# Patient Record
Sex: MALE | Race: BLACK OR AFRICAN AMERICAN | Employment: OTHER | URBAN - METROPOLITAN AREA
[De-identification: names, ages, dates, MRNs, and addresses within clinical notes are randomized per-mention and may not be internally consistent; named-entity substitution may affect disease eponyms.]

---

## 2017-01-10 ENCOUNTER — DOCUMENTATION ONLY (OUTPATIENT)
Dept: ORTHOPEDIC SURGERY | Age: 62
End: 2017-01-10

## 2017-01-10 NOTE — PROGRESS NOTES
[facility]. Faxed to Morris KANG at [facility].

## 2017-01-11 ENCOUNTER — DOCUMENTATION ONLY (OUTPATIENT)
Dept: PAIN MANAGEMENT | Age: 62
End: 2017-01-11

## 2017-01-11 NOTE — PROGRESS NOTES
Request for records from The [address] [place] [address] and faxed request to [employer] to be process on 01/11/2017.

## 2017-01-16 ENCOUNTER — DOCUMENTATION ONLY (OUTPATIENT)
Dept: ORTHOPEDIC SURGERY | Age: 62
End: 2017-01-16

## 2017-01-17 ENCOUNTER — DOCUMENTATION ONLY (OUTPATIENT)
Dept: ORTHOPEDIC SURGERY | Facility: CLINIC | Age: 62
End: 2017-01-17

## 2017-01-17 NOTE — PROGRESS NOTES
Dept of [facility] request for records was sent to [address] [place] [address] for processing.

## 2017-01-25 ENCOUNTER — OFFICE VISIT (OUTPATIENT)
Dept: ORTHOPEDIC SURGERY | Age: 62
End: 2017-01-25

## 2017-01-25 VITALS
BODY MASS INDEX: 30.34 KG/M2 | TEMPERATURE: 98 F | SYSTOLIC BLOOD PRESSURE: 123 MMHG | DIASTOLIC BLOOD PRESSURE: 69 MMHG | WEIGHT: 230 LBS | HEART RATE: 70 BPM

## 2017-01-25 DIAGNOSIS — M17.0 PRIMARY OSTEOARTHRITIS OF BOTH KNEES: Primary | ICD-10-CM

## 2017-01-25 RX ORDER — HYALURONATE SODIUM 10 MG/ML
2 SYRINGE (ML) INTRAARTICULAR ONCE
Qty: 4 ML | Refills: 0
Start: 2017-01-25 | End: 2017-01-25

## 2017-01-25 NOTE — PROGRESS NOTES
Patient: Coreen Rose                MRN: [id number]       SSN: [id number]  YOB: 1955        AGE: 59 y.o. SEX: male  Body mass index is 30.34 kg/(m^2). PCP: Reno Mortimer, MD  01/25/17    Chief Complaint   Patient presents with    Knee Pain     Manolo       HISTORY:  Coreen Rose is a 59 y.o. male who is seen for bilateral knee pain and euflexxa injections. PROCEDURE:  Patient's bilateral knees, after timeout under sterile conditions, were injected with 2 cc of Euflexxa. [facility] - [facility]  OFFICE PROCEDURE PROGRESS NOTE        Chart reviewed for the following:   Rosalina Villagran MD, have reviewed the History, Physical and updated the Allergic reactions for [address] performed immediately prior to start of procedure:   Rosalina Villagran MD, have performed the following reviews on Coreen Rose prior to the start of the procedure:            * Patient was identified by name and date of birth   * Agreement on procedure being performed was verified  * Risks and Benefits explained to the patient  * Procedure site verified and marked as necessary  * Patient was positioned for comfort  * Consent was signed and verified     Time: 4:16 PM       Date of procedure: 1/25/2017    Procedure performed by:  Ulises Boykin MD    Provider assisted by: None     How tolerated by patient: tolerated the procedure well with no complications    Comments: none    IMPRESSION:     ICD-10-CM ICD-9-CM    1. Primary osteoarthritis of both knees M17.0 715.16 [place] DRAIN/INJECT LARGE JOINT/BURSA      EUFLEXXA INJECTION PER DOSE      sodium hyaluronate (SUPARTZ FX/HYALGAN/GENIVSC) 10 mg/mL syrg injection        PLAN:  Mr. Florian Tian will return in one week for his second Euflexxa injection.       Scribed by Minnie Lee ([facility]) as dictated by MD Ulises Díaz M.D.   [facility] and [facility]

## 2017-01-25 NOTE — PATIENT INSTRUCTIONS
Hyaluronic Acid (By injection)   Hyaluronic Acid (cwd-br-eji-ON-ate AS-id)  Treats severe pain in your knee due to osteoarthritis. Brand Name(s):Euflexxa, Gel-One, GenVisc 850, Hyalgan, Hymovis, Monovisc, Orthovisc, Supartz, Supartz FX   There may be other brand names for this medicine. When This Medicine Should Not Be Used: This medicine is not right for everyone. You should not receive it if you had an allergic reaction to hyaluronic acid or if you have a bleeding disorder. How to Use This Medicine:   Injectable  · Your doctor will tell you how many injections you will need. This medicine is injected into your knee joint. · A nurse or other health provider will give you this medicine. Drugs and Foods to Avoid:      Ask your doctor or pharmacist before using any other medicine, including over-the-counter medicines, vitamins, and herbal products. Warnings While Using This Medicine:   · Tell your doctor if you are pregnant or breastfeeding, or if you have any allergies, including to birds, feathers, or eggs. · Rest your knee for 48 hours after you receive an injection. Do not do strenuous, weightbearing activities, such as jogging or tennis. Avoid activities that keep you standing for longer than 1 hour. Possible Side Effects While Using This Medicine:   Call your doctor right away if you notice any of these side effects:  · Allergic reaction: Itching or hives, swelling in your face or hands, swelling or tingling in your mouth or throat, chest tightness, trouble breathing  If you notice these less serious side effects, talk with your doctor:   · Mild increase in pain or swelling in your knee  · Pain, redness, or swelling where the medicine is injected  If you notice other side effects that you think are caused by this medicine, tell your doctor. Call your doctor for medical advice about side effects.  You may report side effects to FDA at 2-964-FDA-2827  © 2016 [address] is for End User's use only and may not be sold, redistributed or otherwise used for commercial purposes. The above information is an  only. It is not intended as medical advice for individual conditions or treatments. Talk to your doctor, nurse or pharmacist before following any medical regimen to see if it is safe and effective for you.

## 2017-02-01 ENCOUNTER — OFFICE VISIT (OUTPATIENT)
Dept: ORTHOPEDIC SURGERY | Age: 62
End: 2017-02-01

## 2017-02-01 VITALS
HEIGHT: 73 IN | TEMPERATURE: 95.1 F | WEIGHT: 227.6 LBS | HEART RATE: 61 BPM | BODY MASS INDEX: 30.17 KG/M2 | SYSTOLIC BLOOD PRESSURE: 129 MMHG | DIASTOLIC BLOOD PRESSURE: 86 MMHG

## 2017-02-01 DIAGNOSIS — M17.0 BILATERAL PRIMARY OSTEOARTHRITIS OF KNEE: Primary | ICD-10-CM

## 2017-02-01 RX ORDER — HYALURONATE SODIUM 10 MG/ML
2 SYRINGE (ML) INTRAARTICULAR ONCE
Qty: 2 ML | Refills: 0
Start: 2017-02-01 | End: 2017-02-01

## 2017-02-01 NOTE — PATIENT INSTRUCTIONS

## 2017-02-01 NOTE — PROGRESS NOTES
Patient: Aron Taylor                MRN: [id number]       SSN: [id number]  YOB: 1955        AGE: 59 y.o. SEX: male  Body mass index is 30.03 kg/(m^2). PCP: Willy De La Garza MD  02/01/17    Chief Complaint   Patient presents with    Knee Pain     Manolo       HISTORY:  Aron Taylor is a 59 y.o. male who is seen for bilateral knee pain and the second euflexxa injections. PROCEDURE:  Patient's bilateral knees, after timeout under sterile conditions, were injected with 2 cc of Euflexxa. [facility]  OFFICE PROCEDURE PROGRESS NOTE        Chart reviewed for the following:   Yvonne Estrada MD, have reviewed the History, Physical and updated the Allergic reactions for [address] performed immediately prior to start of procedure:   Yvonne Estrada MD, have performed the following reviews on Aron Taylor prior to the start of the procedure:            * Patient was identified by name and date of birth   * Agreement on procedure being performed was verified  * Risks and Benefits explained to the patient  * Procedure site verified and marked as necessary  * Patient was positioned for comfort  * Consent was signed and verified     Time: 8:11 AM    Date of procedure: 2/1/2017    Procedure performed by:  Wilfredo Dean MD    Provider assisted by: None     How tolerated by patient: tolerated the procedure well with no complications    Comments: none    IMPRESSION:     ICD-10-CM ICD-9-CM    1. Bilateral primary osteoarthritis of knee M17.0 715.16 [place] DRAIN/INJECT LARGE JOINT/BURSA      EUFLEXXA INJECTION PER DOSE      sodium hyaluronate (SUPARTZ FX/HYALGAN/GENIVSC) 10 mg/mL syrg injection        PLAN:  Mr. Mekhi Gamez will return in one week for his third Euflexxa injection.       Scribed by You Josue ([address]) as dictated by MD Wilfredo Ureña M.D.   [facility] and [facility] Specialist

## 2017-02-08 ENCOUNTER — OFFICE VISIT (OUTPATIENT)
Dept: ORTHOPEDIC SURGERY | Age: 62
End: 2017-02-08

## 2017-02-08 VITALS
DIASTOLIC BLOOD PRESSURE: 65 MMHG | WEIGHT: 231.7 LBS | TEMPERATURE: 95.8 F | SYSTOLIC BLOOD PRESSURE: 122 MMHG | HEIGHT: 73 IN | BODY MASS INDEX: 30.71 KG/M2 | HEART RATE: 66 BPM

## 2017-02-08 DIAGNOSIS — M17.0 BILATERAL PRIMARY OSTEOARTHRITIS OF KNEE: Primary | ICD-10-CM

## 2017-02-08 RX ORDER — HYALURONATE SODIUM 10 MG/ML
2 SYRINGE (ML) INTRAARTICULAR ONCE
Qty: 2 ML | Refills: 0
Start: 2017-02-08 | End: 2017-02-08

## 2017-03-01 ENCOUNTER — OFFICE VISIT (OUTPATIENT)
Dept: ORTHOPEDIC SURGERY | Age: 62
End: 2017-03-01

## 2017-03-01 VITALS
DIASTOLIC BLOOD PRESSURE: 75 MMHG | HEART RATE: 68 BPM | RESPIRATION RATE: 14 BRPM | OXYGEN SATURATION: 98 % | SYSTOLIC BLOOD PRESSURE: 115 MMHG | TEMPERATURE: 97.5 F | WEIGHT: 225 LBS | BODY MASS INDEX: 29.82 KG/M2 | HEIGHT: 73 IN

## 2017-03-01 DIAGNOSIS — M17.0 BILATERAL PRIMARY OSTEOARTHRITIS OF KNEE: Primary | ICD-10-CM

## 2017-03-01 NOTE — PROGRESS NOTES
Judy Calderon  1955   Chief Complaint   Patient presents with    Knee Pain     shant         HISTORY OF PRESENT ILLNESS  Judy Calderon is a 57 y.o. male who presents today for reevaluation of bilateral knees s/p euflexxa injection series. He started feeling some relief after the second injection but following third injection the pain started to worsen again. Today states pain is a 7/10. Very stiff. Has dull aching pain. Feels like he is limping slightly. However, he states from before we started injections he does feel slightly better. Patient denies any fever, chills, chest pain, shortness of breath or calf pain. There are no new illness or injuries to report since last seen in the office. No changes in medications, allergies, social or family history. PHYSICAL EXAM:   Visit Vitals    /75 (BP 1 Location: Left arm, BP Patient Position: Sitting)    Pulse 68    Temp 97.5 °F (36.4 °C) (Oral)    Resp 14    Ht 6' 1\" (1.854 m)    Wt 225 lb (102.1 kg)    SpO2 98%    BMI 29.69 kg/m2     The patient is a well-developed, well-nourished male   in no acute distress. The patient is alert and oriented times three. The patient is alert and oriented times three. Mood and affect are normal.  LYMPHATIC: lymph nodes are not enlarged and are within normal limits  SKIN: normal in color and non tender to palpation. There are no bruises or abrasions noted. NEUROLOGICAL: Motor sensory exam is within normal limits. Reflexes are equal bilaterally.  There is normal sensation to pinprick and light touch  MUSCULOSKELETAL:  Examination Left knee Right knee   Skin Intact Intact   Range of motion 0-120 0-120   Effusion - -   Medial joint line tenderness + +   Lateral joint line tenderness - -   Tenderness Pes Bursa - -   Tenderness insertion MCL - -   Tenderness insertion LCL - -   Renettas - -   Patella crepitus - -   Patella grind - -   Lachman - -   Pivot shift - -   Anterior drawer - -   Posterior drawer - - Varus stress - -   Valgus stress - -   Neurovascular Intact Intact   Calf Swelling and Tenderness to Palpation - -   Shay's Test - -   Hamstring Cord Tightness - -       IMPRESSION:      ICD-10-CM ICD-9-CM    1. Bilateral primary osteoarthritis of knee M17.0 715.16         PLAN:   1. Discussed at length patient's discomfort in bilateral knees. 2. Will continue to see if knees will improve over the next few months  3.  IF his pain worsens will consider total knee replacement and will refer to either Dr. Kathrine Gaffney or Dr. Zahraa Evans  RTC PRN  Follow-up Disposition: Not on [address] [facility]

## 2017-03-14 RX ORDER — METAXALONE 800 MG/1
TABLET ORAL
Qty: 30 TAB | Refills: 0 | Status: SHIPPED | OUTPATIENT
Start: 2017-03-14

## 2017-08-03 ENCOUNTER — OFFICE VISIT (OUTPATIENT)
Dept: PAIN MANAGEMENT | Age: 62
End: 2017-08-03

## 2017-08-03 VITALS
HEART RATE: 63 BPM | RESPIRATION RATE: 14 BRPM | SYSTOLIC BLOOD PRESSURE: 128 MMHG | WEIGHT: 236 LBS | DIASTOLIC BLOOD PRESSURE: 70 MMHG | BODY MASS INDEX: 31.28 KG/M2 | HEIGHT: 73 IN | TEMPERATURE: 96.8 F

## 2017-08-03 DIAGNOSIS — G89.4 CHRONIC PAIN SYNDROME: ICD-10-CM

## 2017-08-03 DIAGNOSIS — M43.16 SPONDYLOLISTHESIS, LUMBAR REGION: ICD-10-CM

## 2017-08-03 DIAGNOSIS — M47.816 LUMBAR FACET ARTHROPATHY: ICD-10-CM

## 2017-08-03 DIAGNOSIS — M47.816 SPONDYLOSIS OF LUMBAR REGION WITHOUT MYELOPATHY OR RADICULOPATHY: Primary | ICD-10-CM

## 2017-08-03 NOTE — PROGRESS NOTES
[facility]  Interventional Pain Management Consultation History & Physical    PATIENT NAME:  Gilbert Cuevas     YOB: 1955    DATE OF SERVICE:   8/3/2017      CHIEF COMPLAINT:  Back Pain      REASON FOR VISIT:   Gilbert Cuevas presents to the pain clinic today for follow on evaluation and to consider interventional pain management options as indicated for the type and location of the pain the patient is presenting with. HISTORY OF PRESENT ILLNESS: Patient presents for follow-up evaluation and to reconsider repeating his lumbar radiofrequency neurotomy procedures that we have performed several months ago. He has obtained very good relief from these previous lumbar radiofrequency neurotomy procedures. These were done at bilateral L3-4, L4-5, L5-S1 levels. He states that when these were done several months ago he was able to stand for longer periods of time without pain he was able to walk for longer periods of time without pain overall he felt very much improved with regard to his low back pain. Has begun to develop a recurrence of his low back pain. He denies radicular pain. He denies radiating leg pain. He endorses lumbar facet loading which exacerbates his low back pain, including lumbar twisting and turning, extension and flexion that exacerbate his low back pain. Medications are helping but are becoming less effective      We reviewed his lumbar MRI, this was completed by 19 2016. This is insignificant for lumbar facet arthropathy essentially throughout the lumbar spine from L1 to, L2-3, L3-4, L4-5, L5-S1. No evidence for neural impingement or compression is noted. Overall impression advanced facet arthropathy grade 1 degenerative anterolisthesis and mild left foraminal narrowing. ASSESSMENT/OPTIONS: as follows. We discussed options.   I believe it is very reasonable to repeat this gentlemen's lumbar radiofrequency neurotomy procedures at bilateral L3-4, L4-5, L5-S1 levels. He has obtained very good results from these procedures which were done several months ago. He has had interval of no low back pain with greatly increased activity again not producing any low back pain. His pain has recently begun to recur. He has same pain for which we treated him with lumbar radiofrequency neurotomy procedure so well before. We will repeat these again. I have discussed the risks and benefits, indications, contraindications, and side effects of intended procedure with the patient. I have used skeleton spine model to describe and discuss the procedure with the patient. I have answered all questions relating to the procedure. Patient understands the nature of the procedure and wishes to proceed. Patient has no further questions. MRI Results (most recent):    Results from Abstract encounter on 10/07/15   MRI SHOULDER LT WO CONT        PAST MEDICAL HISTORY:   The patient  has a past medical history of BPH (benign prostatic hyperplasia); Diabetes ([address] Erectile dysfunction; Sleep apnea; and Slow urinary stream.    PAST SURGICAL HISTORY:   The patient  has a past surgical history that includes hernia repair; anesth,surgery of shoulder; orthopaedic (2003 & 2004); knee arthroscopy (Bilateral); and other surgical.    CURRENT MEDICATIONS:   The patient has a current medication list which includes the following prescription(s): amoxicillin, voltaren, ascorbic acid (vitamin c), omega 3-dha-epa-fish oil, ginkgo biloba/panax ginseng rt, sildenafil, sildenafil, metaxalone, dutasteride, tadalafil, freestyle lite strips, piroxicam, metaxalone, azelastine, lidocaine, simvastatin, cetirizine, multivitamin, aspirin delayed-release, cyanocobalamin, and calcium polycarbophil.     ALLERGIES:     Allergies   Allergen Reactions    Other Medication Unable to Obtain     Dust, mold and dander       FAMILY HISTORY:   The patient family history is not on file. He was adopted. SOCIAL HISTORY:   The patient  reports that he quit smoking about 31 years ago. He has never used smokeless tobacco. The patient  reports that he does not drink alcohol. He also  reports that he does not use illicit drugs. REVIEW OF SYSTEMS:    The patient denies fever, chills, weight loss (Constitutional), rash, itching (Skin), tinnitus, congestion (HENT), blurred vision, photophobia (Eyes), palpitations, orthopnea (Cardiovascular), hemoptysis, wheezing (Respiratory), nausea, vomiting, diarrhea (Gastrointestinal), dysuria, hematuria, urgency (Genitourinary), bowel or bladder incontinence, loss of consciousness (Neurologic), suicidal or homicidal ideation or hallucinations (Psychiatric). Denies swelling, axillary or groin masses (Lymphatic). PHYSICAL EXAM:  VS:   Visit Vitals    /70    Pulse 63    Temp 96.8 °F (36 °C)    Resp 14    Ht 6' 1\" (1.854 m)    Wt 107 kg (236 lb)    BMI 31.14 kg/m2     General: Well-developed and well-nourished. Body habitus consistent with recorded height and weight and the calculated BMI. Apparent distress due to low back pain. Head: Normocephalic, atraumatic. Skin: Inspection of the skin reveals no rashes, lesions or infection. CV: Regular rate. No murmurs or rubs noted. No peripheral edema noted. Pulm: Respirations are even and unlabored. Extr: No clubbing, cyanosis, or edema noted. Musculoskeletal:  1. Cervical spine  Full ROM. No paraspinous tenderness at any level. There is no scoliosis, asymmetry, or musculoskeletal defect. 2. Thoracic spine  Full ROM. No paraspinous tenderness at any level. There is no scoliosis, asymmetry, or musculoskeletal defect. 3. Lumbar spine decreased range of motion all axes . Paraspinous tenderness bilaterally. SI joints are nontender bilaterally. There is no scoliosis, asymmetry, or musculoskeletal defect. 4. Right upper extremity  Full ROM. 5/5 muscle strength in all muscle groups. No pain or tenderness in shoulder, elbow, wrist, or hand. 5. Left upper extremity  Full ROM. 5/5 muscle strength in all muscle groups. No pain or tenderness in shoulder, elbow, wrist, or hand. 6. Right lower extremity  Full ROM. 5/5 muscle strength in all muscle groups. No pain, tenderness, or swelling in the hip, knee, ankle or foot. 7. Left lower extremity  Full ROM. 5/5 muscle strength in all muscle groups. No pain, tenderness, or swelling in the hip, knee, ankle or foot. Neurological:  1. Mental Status - Alert, awake and oriented. Speech is clear and appropriate. 2. Cranial Nerves - Extraocular muscles intact bilaterally. Cranial nerves II-XII grossly intact bilaterally. 3. Gait - antalgic   4. Reflexes - 2+ and symmetric throughout. 5. Sensation - Intact to light touch and pin prick. 6. Provocative Tests - Straight leg raise negative bilaterally. Psychological:  1. Mood and affect  Appropriate. 2. Speech  Appropriate. 3. Though content  Appropriate. 4. Judgment  Appropriate. ASSESSMENT:      ICD-10-CM ICD-9-CM    1. Spondylosis of lumbar region without myelopathy or radiculopathy M47.816 721.3    2. Lumbar facet arthropathy M12.88 721.3    3. Spondylolisthesis, lumbar region M43.16 738.4    4. Chronic pain syndrome G89.4 338.4            PLAN:    1.    I have thoroughly discussed the risks and benefits, indications, contraindications, and side effects of any and procedures that were mentioned at today's patient visit. I have used a skeleton model to explain all procedures, as well as to provide added emphasis regarding procedures and as well for patient education purposes. I have answered all questions in great detail, and I have obtained verbal confirmation for all procedures planned with the patient. 3.    I have reviewed in great detail today the patient's MRI and other imaging studies with the patient.  I have explained to the patient their condition using both actual recent and relevant images insofar as I am able to obtain actual images. I have used a skeleton model for added emphasis as well as patient education. 4.    I have advised patient to have a primary care provider continue to care for their health maintenance and general medical conditions. 5,    I have placed appropriate referrals to specialty care providers as I have deemed necessary through today's clinical consultation with the patient. 5.    I have explained to the patient that if any significant side effects, issues, problems, concerns, or perceived complications may have arisen at around the time of the patient's procedures, they should either call the pain management clinic or go to the emergency room immediately for medical provider evaluation. 6.   I have encouraged all patients to call the pain management clinic with any questions or concerns that they may have pertaining to their procedures. DISPOSITION:   The patients condition and plan were discussed at length and all questions were answered. The patient agrees with the plan. A total of 15 minutes was spent with the patient of which over half of the time was spent counseling the patient. Ngozi Santos MD 8/3/2017 7:09 PM    Note: Although these clinic notes were documented by the provider at the time of the exam, they have not been proofed and are subject to transcription variance.

## 2017-08-22 ENCOUNTER — TELEPHONE (OUTPATIENT)
Dept: ORTHOPEDIC SURGERY | Age: 62
End: 2017-08-22

## 2017-08-22 DIAGNOSIS — M17.0 PRIMARY OSTEOARTHRITIS OF BOTH KNEES: Primary | ICD-10-CM

## 2017-08-24 RX ORDER — MIDAZOLAM HYDROCHLORIDE 1 MG/ML
.5-6 INJECTION, SOLUTION INTRAMUSCULAR; INTRAVENOUS
Status: CANCELLED | OUTPATIENT
Start: 2017-08-28

## 2017-08-24 RX ORDER — SODIUM CHLORIDE 0.9 % (FLUSH) 0.9 %
5-10 SYRINGE (ML) INJECTION AS NEEDED
Status: CANCELLED | OUTPATIENT
Start: 2017-08-28

## 2017-08-28 ENCOUNTER — HOSPITAL ENCOUNTER (OUTPATIENT)
Age: 62
Setting detail: OUTPATIENT SURGERY
Discharge: HOME | End: 2017-08-28
Attending: PHYSICAL MEDICINE & REHABILITATION | Admitting: PHYSICAL MEDICINE & REHABILITATION
Payer: OTHER GOVERNMENT

## 2017-08-28 ENCOUNTER — APPOINTMENT (OUTPATIENT)
Dept: GENERAL RADIOLOGY | Age: 62
End: 2017-08-28
Attending: PHYSICAL MEDICINE & REHABILITATION
Payer: OTHER GOVERNMENT

## 2017-08-28 VITALS
HEART RATE: 60 BPM | BODY MASS INDEX: 31.28 KG/M2 | RESPIRATION RATE: 18 BRPM | WEIGHT: 236 LBS | TEMPERATURE: 98.4 F | HEIGHT: 73 IN | SYSTOLIC BLOOD PRESSURE: 120 MMHG | DIASTOLIC BLOOD PRESSURE: 81 MMHG | OXYGEN SATURATION: 98 %

## 2017-08-28 LAB — GLUCOSE BLD STRIP.AUTO-MCNC: 123 MG/DL (ref 70–110)

## 2017-08-28 PROCEDURE — 74011000250 HC RX REV CODE- 250

## 2017-08-28 PROCEDURE — 82962 GLUCOSE BLOOD TEST: CPT

## 2017-08-28 PROCEDURE — 77030020508 HC PD GRND GENRTR BAYL -A: Performed by: PHYSICAL MEDICINE & REHABILITATION

## 2017-08-28 PROCEDURE — 76010000009 HC PAIN MGT 0 TO 30 MIN PROC: Performed by: PHYSICAL MEDICINE & REHABILITATION

## 2017-08-28 PROCEDURE — 74011250636 HC RX REV CODE- 250/636

## 2017-08-28 PROCEDURE — 77030029505: Performed by: PHYSICAL MEDICINE & REHABILITATION

## 2017-08-28 PROCEDURE — 99152 MOD SED SAME PHYS/QHP 5/>YRS: CPT | Performed by: PHYSICAL MEDICINE & REHABILITATION

## 2017-08-28 RX ORDER — SODIUM CHLORIDE 0.9 % (FLUSH) 0.9 %
5-10 SYRINGE (ML) INJECTION AS NEEDED
Status: DISCONTINUED | OUTPATIENT
Start: 2017-08-28 | End: 2017-08-28 | Stop reason: HOSPADM

## 2017-08-28 RX ORDER — DEXAMETHASONE SODIUM PHOSPHATE 100 MG/10ML
INJECTION INTRAMUSCULAR; INTRAVENOUS AS NEEDED
Status: DISCONTINUED | OUTPATIENT
Start: 2017-08-28 | End: 2017-08-28 | Stop reason: HOSPADM

## 2017-08-28 RX ORDER — MIDAZOLAM HYDROCHLORIDE 1 MG/ML
.5-6 INJECTION, SOLUTION INTRAMUSCULAR; INTRAVENOUS
Status: DISCONTINUED | OUTPATIENT
Start: 2017-08-28 | End: 2017-08-28 | Stop reason: HOSPADM

## 2017-08-28 RX ORDER — LIDOCAINE HYDROCHLORIDE 20 MG/ML
INJECTION, SOLUTION EPIDURAL; INFILTRATION; INTRACAUDAL; PERINEURAL AS NEEDED
Status: DISCONTINUED | OUTPATIENT
Start: 2017-08-28 | End: 2017-08-28 | Stop reason: HOSPADM

## 2017-08-28 RX ORDER — FENTANYL CITRATE 50 UG/ML
INJECTION, SOLUTION INTRAMUSCULAR; INTRAVENOUS AS NEEDED
Status: DISCONTINUED | OUTPATIENT
Start: 2017-08-28 | End: 2017-08-28 | Stop reason: HOSPADM

## 2017-08-28 RX ORDER — LIDOCAINE HYDROCHLORIDE 10 MG/ML
INJECTION, SOLUTION EPIDURAL; INFILTRATION; INTRACAUDAL; PERINEURAL AS NEEDED
Status: DISCONTINUED | OUTPATIENT
Start: 2017-08-28 | End: 2017-08-28 | Stop reason: HOSPADM

## 2017-08-28 NOTE — DISCHARGE INSTRUCTIONS
[address] [place] [address] for Pain Management      Post Procedures Instructions    *Resume Diet and Activity as tolerated. Rest for the remainder of the day. *You may fell worse before you feel better as the numbing medications wear off before the steroids take effect if used for your procedures. *Do not use affected extremity until numbness or loss of sensation has completely resolved without assistance. *DO NOT DRIVE, operate machinery/heavey equipment for 24 hours. *DO NOT DRINK ALCOHOL for 24 hours as it may interact with the sedation if you received it and also thins your blood and may cause you to bleed. *WAIT 24 hours before starting back ANY Blood thinning medications:   (Heparin, Coumadin, Warfarin, Lovenox, Plavix, Aggrenox)    *Resume Pre-Procedure Medications as prescribed except Blood Thinners unless directed by your Physician or Cardiologist.     *Avoid Hot tubs and Heating pad for 24 hours to prevent dissipation of medications, you may shower to remove bandages and remaining prep residue on the skin. * If you develop a Headache, drink plenty of fluids including beverages with caffeine (Coffee, Mt. Dew etc.) and rest.  If the headache persists longer than 24 hoursor intensifies - Please call [facility] ([facility]) [phone]      * If you are DIABETIC, check your blood sugar three times a day for the next three days, the steroids will increase your blood sugar. If your blood sugar is greater than 400 have someone drive you to the nearest [address]. * If you experience any of the following problems, call the [facility] [phone] between 8:00 am - 4:30pm or After Hours [phone].     Shortness of breath    Fever of 101 F or higher    Nausea / Vomiting (not normal to you)    Increasing stiffness in the neck    Weakness or numbness in the arms or legs that is not resolving    Prolonged and increasing pain > than 4 days    ANYTHING OUT of the ORDINARY TO YOU    If YOU are experiencing a severe reaction / complication that you have never had before post procedure, call 911 or go to the nearest emergency room! All patients must have a  for transportation [place] regardless if you do or do not receive sedation. DISCHARGE SUMMARY from Nurse      PATIENT INSTRUCTIONS:    After Oral  or intravenous sedation, for 24 hours or while taking prescription Narcotics:  · Limit your activities  · Do not drive and operate hazardous machinery  · Do not make important personal or business decisions  · Do  not drink alcoholic beverages  · If you have not urinated within 8 hours after discharge, please contact your surgeon on call. Report the following to your surgeon:  · Excessive pain, swelling, redness or odor of or around the surgical area  · Temperature over 101  · Nausea and vomiting lasting longer than 4 hours or if unable to take medications  · Any signs of decreased circulation or nerve impairment to extremity: change in color, persistent  numbness, tingling, coldness or increase pain  · Any questions        What to do at Home:  Recommended activity: Activity as tolerated, NO DRIVING FOR 24 Hours post injection          *  Please give a list of your current medications to your Primary Care Provider. *  Please update this list whenever your medications are discontinued, doses are      changed, or new medications (including over-the-counter products) are added. *  Please carry medication information at all times in case of emergency situations. These are general instructions for a healthy lifestyle:    No smoking/ No tobacco products/ Avoid exposure to second hand smoke    Surgeon General's Warning:  Quitting smoking now greatly reduces serious risk to your health.     Obesity, smoking, and sedentary lifestyle greatly increases your risk for illness    A healthy diet, regular physical exercise & weight monitoring are important for maintaining a healthy lifestyle    You may be retaining fluid if you have a history of heart failure or if you experience any of the following symptoms:  Weight gain of 3 pounds or more overnight or 5 pounds in a week, increased swelling in our hands or feet or shortness of breath while lying flat in bed. Please call your doctor as soon as you notice any of these symptoms; do not wait until your next office visit. Recognize signs and symptoms of STROKE:    F-face looks uneven    A-arms unable to move or move unevenly    S-speech slurred or non-existent    T-time-call 911 as soon as signs and symptoms begin-DO NOT go       Back to bed or wait to see if you get better-TIME IS BRAIN. [employer] Activation    Thank you for requesting access to [employer]. Please follow the instructions below to securely access and download your online medical record. [employer] allows you to send messages to your doctor, view your test results, renew your prescriptions, schedule appointments, and more. How Do I Sign Up? 1. In your internet browser, go to [email or web address]  2. Click on the First Time User? Click Here link in the Sign In box. You will be redirect to the New Member Sign Up page. 3. Enter your [employer] Access Code exactly as it appears below. You will not need to use this code after youve completed the sign-up process. If you do not sign up before the expiration date, you must request a new code. [employer] Access Code: [email or web address]  Expires: 2017  7:05 AM (This is the date your [employer] access code will )    4. Enter the last four digits of your Social Security Number (xxxx) and Date of Birth (mm/dd/yyyy) as indicated and click Submit. You will be taken to the next sign-up page. 5. Create a [employer] ID. This will be your [employer] login ID and cannot be changed, so think of one that is secure and easy to remember. 6. Create a [employer] password.  You can change your password at any time. 7. Enter your Password Reset Question and Answer. This can be used at a later time if you forget your password. 8. Enter your e-mail address. You will receive e-mail notification when new information is available in [address] 9. Click Sign Up. You can now view and download portions of your medical record. 10. Click the Download Summary menu link to download a portable copy of your medical information. Additional Information    If you have questions, please visit the Frequently Asked Questions section of the [employer] website at [email or web address] Remember, [employer] is NOT to be used for urgent needs. For medical emergencies, dial 911.

## 2017-08-28 NOTE — INTERVAL H&P NOTE
H&P Update:  Wilner Rushing was seen and examined. History and physical has been reviewed. The patient has been examined.  There have been no significant clinical changes since the completion of the originally dated History and Physical.    Signed By: Vickie Abraham MD     August 28, 2017 7:17 AM

## 2017-08-28 NOTE — PROCEDURES
[facility]    THERMOCOAGULATION OF LUMBAR MEDIAL BRANCH  PROCEDURE REPORT      PATIENT:  Helene Mosqueda OF BIRTH:  1955  DATE OF SERVICE:  8/28/2017  SITE:  [facility]    PRE-PROCEDURE DIAGNOSIS:  See Above    POST-PROCEDURE DIAGNOSIS:  See Above    PROCEDURE:      1. Right radiofrequency thermocoagulation of lumbar medial branch nerves,  L3/L4, L4/L5, L5/S1 (48517, 64636 x2)  2. Fluoroscopic needle guidance (spinal) (95387)  3. Supervision of moderate sedation (46209)  4. Additional 15 minutes of supervised moderate sedation (18036)    ANESTHESIA:  Local with moderate IV sedation. See Medication Administration Record for specific medications and dosage. COMPLICATIONS: None. PHYSICIAN:  Baljit Burciaga MD    PRE-PROCEDURE NOTE:  Pre-procedural assessment of the patient was performed including a limited history and physical examination. The details of the procedure were discussed with the patient, including the risks, benefits and alternative options and an informed consent was obtained. The patients NPO status, if necessary for the specific procedure and/or administration of moderate intravenous sedation, if utilized, and availability of a responsible adult to escort the patient following the procedure were confirmed. A peripheral intravenous cannula was placed without difficulty and lactated Ringers solution administered. See nursing notes for details. PROCEDURE NOTE:  The patient was brought to the procedure suite and positioned on the fluoroscopy table in the prone position. Physiologic monitors were applied and supplemental oxygen was administered via nasal cannula. The skin was prepped in the standard surgical fashion and sterile drapes were applied over the procedure site.  Please refer to the Flowsheet for documentation of the patients vital signs and the Medication Administration Record for any oral and/or intravenous sedation administered prior to or during the procedure. 1% Lidocaine was utilized for local anesthesia. Under 10-15 degree ipsilateral oblique fluoroscopic guidance a 15cm 18gauge radiofrequency needle with a 10 mm curved active tip was advanced to the junction of the superior articular process and transverse process of each vertebral level immediately inferior to the above-mentioned dorsal rami medial branch nerves. Under AP fluoroscopic guidance, a similar needle was then placed over the superior margin of the sacral ala to thermocoagulate the L5 medial branch nerve. After each individual needle was placed, sensory and motor testing, at 50 Hz and 2 Hz, respectively, were performed which elicited ipsilateral deep local back discomfort without evidence of motor stimulation in the ipsilateral gluteal muscles or extremity. Following this, 1 mL of lidocaine 2% was injected after the negative aspiration of blood, air or CSF. Final correct needle placement was then confirmed by viewing each needle in AP and lateral fluoroscopic views in addition to repeat sensory and motor testing which, again, elicited ipsilateral deep local back discomfort without evidence of motor stimulation in the ipsilateral gluteal muscles or extremity. Medial branch nerve radiofrequency thermocoagulation was then performed at each level for 120 seconds at 80° centigrade x 1 cycle. Following this, 1/2ml to 1ml of a mixture of lidocaine 1% admixed with dexamethasone 5mg [10mg/ml] was injected through each radiofrequency needle after negative aspiration and before removing each needle. Then, all needles were removed intact. The area was thoroughly cleaned and sterile bandages applied as necessary. The patient tolerated the procedure well without complication and the vital signs remained stable throughout the procedure.     POST-PROCEDURE COURSE:   The patient was escorted from the procedure suite in satisfactory condition and recovered per facility protocol based on the type of procedure performed and/or the sedation utilized. The patient did not experience any adverse events and remained hemodynamically stable during the post-procedure period. DISCHARGE NOTE:  Upon discharge, the patient was able to tolerate fluids and was in no acute distress. The patient was oriented to person, place and time and vital signs were stable. Appropriate post-procedure instructions were provided and explained to the patient in detail and all questions were answered.                     Domo Xiao MD 8/28/2017 9:28 AM

## 2017-09-06 RX ORDER — SODIUM CHLORIDE 0.9 % (FLUSH) 0.9 %
5-10 SYRINGE (ML) INJECTION AS NEEDED
Status: CANCELLED | OUTPATIENT
Start: 2017-09-11

## 2017-09-06 RX ORDER — MIDAZOLAM HYDROCHLORIDE 1 MG/ML
.5-6 INJECTION, SOLUTION INTRAMUSCULAR; INTRAVENOUS
Status: CANCELLED | OUTPATIENT
Start: 2017-09-11

## 2017-09-11 ENCOUNTER — APPOINTMENT (OUTPATIENT)
Dept: GENERAL RADIOLOGY | Age: 62
End: 2017-09-11
Attending: PHYSICAL MEDICINE & REHABILITATION
Payer: OTHER GOVERNMENT

## 2017-09-11 ENCOUNTER — HOSPITAL ENCOUNTER (OUTPATIENT)
Age: 62
Setting detail: OUTPATIENT SURGERY
Discharge: HOME | End: 2017-09-11
Attending: PHYSICAL MEDICINE & REHABILITATION | Admitting: PHYSICAL MEDICINE & REHABILITATION
Payer: OTHER GOVERNMENT

## 2017-09-11 VITALS
WEIGHT: 236 LBS | HEIGHT: 73 IN | DIASTOLIC BLOOD PRESSURE: 70 MMHG | TEMPERATURE: 97.7 F | SYSTOLIC BLOOD PRESSURE: 103 MMHG | RESPIRATION RATE: 14 BRPM | BODY MASS INDEX: 31.28 KG/M2 | OXYGEN SATURATION: 94 % | HEART RATE: 66 BPM

## 2017-09-11 LAB — GLUCOSE BLD STRIP.AUTO-MCNC: 119 MG/DL (ref 70–110)

## 2017-09-11 PROCEDURE — 99152 MOD SED SAME PHYS/QHP 5/>YRS: CPT | Performed by: PHYSICAL MEDICINE & REHABILITATION

## 2017-09-11 PROCEDURE — 82962 GLUCOSE BLOOD TEST: CPT

## 2017-09-11 PROCEDURE — 77030029505: Performed by: PHYSICAL MEDICINE & REHABILITATION

## 2017-09-11 PROCEDURE — 74011250636 HC RX REV CODE- 250/636

## 2017-09-11 PROCEDURE — 76010000009 HC PAIN MGT 0 TO 30 MIN PROC: Performed by: PHYSICAL MEDICINE & REHABILITATION

## 2017-09-11 PROCEDURE — 77030020508 HC PD GRND GENRTR BAYL -A: Performed by: PHYSICAL MEDICINE & REHABILITATION

## 2017-09-11 PROCEDURE — 74011000250 HC RX REV CODE- 250

## 2017-09-11 RX ORDER — LIDOCAINE HYDROCHLORIDE 10 MG/ML
INJECTION, SOLUTION EPIDURAL; INFILTRATION; INTRACAUDAL; PERINEURAL AS NEEDED
Status: DISCONTINUED | OUTPATIENT
Start: 2017-09-11 | End: 2017-09-11 | Stop reason: HOSPADM

## 2017-09-11 RX ORDER — FENTANYL CITRATE 50 UG/ML
INJECTION, SOLUTION INTRAMUSCULAR; INTRAVENOUS AS NEEDED
Status: DISCONTINUED | OUTPATIENT
Start: 2017-09-11 | End: 2017-09-11 | Stop reason: HOSPADM

## 2017-09-11 RX ORDER — MIDAZOLAM HYDROCHLORIDE 1 MG/ML
.5-6 INJECTION, SOLUTION INTRAMUSCULAR; INTRAVENOUS
Status: DISCONTINUED | OUTPATIENT
Start: 2017-09-11 | End: 2017-09-11 | Stop reason: HOSPADM

## 2017-09-11 RX ORDER — DEXAMETHASONE SODIUM PHOSPHATE 100 MG/10ML
INJECTION INTRAMUSCULAR; INTRAVENOUS AS NEEDED
Status: DISCONTINUED | OUTPATIENT
Start: 2017-09-11 | End: 2017-09-11 | Stop reason: HOSPADM

## 2017-09-11 RX ORDER — LIDOCAINE HYDROCHLORIDE 20 MG/ML
INJECTION, SOLUTION EPIDURAL; INFILTRATION; INTRACAUDAL; PERINEURAL AS NEEDED
Status: DISCONTINUED | OUTPATIENT
Start: 2017-09-11 | End: 2017-09-11 | Stop reason: HOSPADM

## 2017-09-11 NOTE — H&P
11 Sept 2017, 7:08AM.  Patient seen and examined prior to procedure. Chart and data reviewed. No significant interval changes from previous evaluation noted. Stable for procedure as intended and discussed.  [facility]

## 2017-09-11 NOTE — PROCEDURES
[facility]    THERMOCOAGULATION OF LUMBAR MEDIAL BRANCH  PROCEDURE REPORT      PATIENT:  Ewa South OF BIRTH:  1955  DATE OF SERVICE:  9/11/2017  [facility]    PRE-PROCEDURE DIAGNOSIS:  See Above    POST-PROCEDURE DIAGNOSIS:  See Above    PROCEDURE:      1. Left radiofrequency thermocoagulation of lumbar medial branch nerves, L3/L4, L4/L5, L5/S1 (00940, 64636 x2)  2. Fluoroscopic needle guidance (spinal) (12156)  3. Supervision of moderate sedation (90838)  4. Additional 15 minutes of supervised moderate sedation (59941)    ANESTHESIA:  Local with moderate IV sedation. See Medication Administration Record for specific medications and dosage. COMPLICATIONS: None. PHYSICIAN:  Alka Dotson MD    PRE-PROCEDURE NOTE:  Pre-procedural assessment of the patient was performed including a limited history and physical examination. The details of the procedure were discussed with the patient, including the risks, benefits and alternative options and an informed consent was obtained. The patients NPO status, if necessary for the specific procedure and/or administration of moderate intravenous sedation, if utilized, and availability of a responsible adult to escort the patient following the procedure were confirmed. A peripheral intravenous cannula was placed without difficulty and lactated Ringers solution administered. See nursing notes for details. PROCEDURE NOTE:  The patient was brought to the procedure suite and positioned on the fluoroscopy table in the prone position. Physiologic monitors were applied and supplemental oxygen was administered via nasal cannula. The skin was prepped in the standard surgical fashion and sterile drapes were applied over the procedure site.  Please refer to the Flowsheet for documentation of the patients vital signs and the Medication Administration Record for any oral and/or intravenous sedation administered prior to or during the procedure. 1% Lidocaine was utilized for local anesthesia. Under 10-15 degree ipsilateral oblique fluoroscopic guidance a 15cm 18gauge radiofrequency needle with a 10 mm curved active tip was advanced to the junction of the superior articular process and transverse process of each vertebral level immediately inferior to the above-mentioned dorsal rami medial branch nerves. Under AP fluoroscopic guidance, a similar needle was then placed over the superior margin of the sacral ala to thermocoagulate the L5 medial branch nerve. After each individual needle was placed, sensory and motor testing, at 50 Hz and 2 Hz, respectively, were performed which elicited ipsilateral deep local back discomfort without evidence of motor stimulation in the ipsilateral gluteal muscles or extremity. Following this, 1 mL of lidocaine 2% was injected after the negative aspiration of blood, air or CSF. Final correct needle placement was then confirmed by viewing each needle in AP and lateral fluoroscopic views in addition to repeat sensory and motor testing which, again, elicited ipsilateral deep local back discomfort without evidence of motor stimulation in the ipsilateral gluteal muscles or extremity. Medial branch nerve radiofrequency thermocoagulation was then performed at each level for 120 seconds at 80° centigrade x 1 cycle. Following this, 1/2ml to 1ml of a mixture of lidocaine 1% admixed with dexamethasone 5mg [10mg/ml] was injected through each radiofrequency needle after negative aspiration and before removing each needle. Then, all needles were removed intact. The area was thoroughly cleaned and sterile bandages applied as necessary. The patient tolerated the procedure well without complication and the vital signs remained stable throughout the procedure.     POST-PROCEDURE COURSE:   The patient was escorted from the procedure suite in satisfactory condition and recovered per facility protocol based on the type of procedure performed and/or the sedation utilized. The patient did not experience any adverse events and remained hemodynamically stable during the post-procedure period. DISCHARGE NOTE:  Upon discharge, the patient was able to tolerate fluids and was in no acute distress. The patient was oriented to person, place and time and vital signs were stable. Appropriate post-procedure instructions were provided and explained to the patient in detail and all questions were answered.                     Erika Salinas MD 9/11/2017 9:14 AM

## 2017-09-28 ENCOUNTER — OFFICE VISIT (OUTPATIENT)
Dept: PAIN MANAGEMENT | Age: 62
End: 2017-09-28

## 2017-09-28 VITALS
BODY MASS INDEX: 30.48 KG/M2 | HEIGHT: 73 IN | HEART RATE: 51 BPM | SYSTOLIC BLOOD PRESSURE: 125 MMHG | DIASTOLIC BLOOD PRESSURE: 84 MMHG | WEIGHT: 230 LBS | TEMPERATURE: 97.2 F

## 2017-09-28 DIAGNOSIS — M47.816 LUMBAR FACET ARTHROPATHY: ICD-10-CM

## 2017-09-28 DIAGNOSIS — M43.16 SPONDYLOLISTHESIS, LUMBAR REGION: ICD-10-CM

## 2017-09-28 DIAGNOSIS — M47.816 SPONDYLOSIS OF LUMBAR REGION WITHOUT MYELOPATHY OR RADICULOPATHY: Primary | ICD-10-CM

## 2017-09-28 DIAGNOSIS — G89.4 CHRONIC PAIN SYNDROME: ICD-10-CM

## 2017-09-28 NOTE — PROGRESS NOTES
[address] [place] [address] for Pain Management  Interventional Pain Management Consultation History & Physical    PATIENT NAME:  Camryn Su     YOB: 1955    DATE OF SERVICE:   9/28/2017      CHIEF COMPLAINT:  LOW BACK PAIN      REASON FOR VISIT:   Camryn Su presents to the pain clinic today for follow on evaluation and to consider interventional pain management options as indicated for the type and location of the pain the patient is presenting with. HISTORY OF PRESENT ILLNESS:     Patient presents for follow-up evaluation after his lumbar radiofrequency neurotomy procedures at bilateral L3-4, L4-5, L5-S1 levels. He states that he feels much better with regard to his low back pain. His low back pain is much improved, as much as 80%. He is able to stand up straighter and both sit stand and walk for longer periods of time without pain. He is very appreciative. He feels the procedures were very successful for him. ASSESSMENT/OPTIONS: as follows. We discussed options. Patient can return at any time for follow on management. Should he feel that he needs lumbar radiofrequency procedures repeated within 6-12 months, he can return for clinic visit. We will do a reassessment and if indicated, we can get him back in to repeat lumbar radiofrequency procedures. He understands and appreciates this. MRI Results (most recent):    Results from Abstract encounter on 10/07/15   MRI SHOULDER LT WO CONT        PAST MEDICAL HISTORY:   The patient  has a past medical history of BPH (benign prostatic hyperplasia); Diabetes ([facility]); Erectile dysfunction; Sleep apnea; and Slow urinary stream.    PAST SURGICAL HISTORY:   The patient  has a past surgical history that includes hernia repair; anesth,surgery of shoulder; other surgical; orthopaedic (2003 & 2004); and knee arthroscopy (Bilateral).     CURRENT MEDICATIONS:   The patient has a current medication list which includes the following prescription(s): voltaren, ascorbic acid (vitamin c), omega 3-dha-epa-fish oil, ginkgo biloba/panax ginseng rt, sildenafil, metaxalone, dutasteride, tadalafil, freestyle lite strips, piroxicam, azelastine, lidocaine, simvastatin, cetirizine, multivitamin, aspirin delayed-release, cyanocobalamin, and calcium polycarbophil. ALLERGIES:     Allergies   Allergen Reactions    Other Medication Unable to Obtain     Dust, mold and dander       FAMILY HISTORY:   The patient family history is not on file. He was adopted. SOCIAL HISTORY:   The patient  reports that he quit smoking about 31 years ago. He has never used smokeless tobacco. The patient  reports that he does not drink alcohol. He also  reports that he does not use illicit drugs. REVIEW OF SYSTEMS:    The patient denies fever, chills, weight loss (Constitutional), rash, itching (Skin), tinnitus, congestion (HENT), blurred vision, photophobia (Eyes), palpitations, orthopnea (Cardiovascular), hemoptysis, wheezing (Respiratory), nausea, vomiting, diarrhea (Gastrointestinal), dysuria, hematuria, urgency (Genitourinary), bowel or bladder incontinence, loss of consciousness (Neurologic), suicidal or homicidal ideation or hallucinations (Psychiatric). Denies swelling, axillary or groin masses (Lymphatic). PHYSICAL EXAM:  VS:   Visit Vitals    /84    Pulse (!) 51    Temp 97.2 °F (36.2 °C)    Ht 6' 1\" (1.854 m)    Wt 104.3 kg (230 lb)    BMI 30.34 kg/m2     General: Well-developed and well-nourished. Body habitus consistent with recorded height and weight and the calculated BMI. No apparent distress. Head: Normocephalic, atraumatic. Skin: Inspection of the skin reveals no rashes, lesions or infection. CV: Regular rate. No murmurs or rubs noted. No peripheral edema noted. Pulm: Respirations are even and unlabored. Extr: No clubbing, cyanosis, or edema noted. Musculoskeletal:  1. Cervical spine - Full ROM.   No paraspinous tenderness at any level. There is no scoliosis, asymmetry, or musculoskeletal defect. 2. Thoracic spine - Full ROM. No paraspinous tenderness at any level. There is no scoliosis, asymmetry, or musculoskeletal defect. 3. Lumbar spine - Full ROM. No paraspinous tenderness at any level. SI joints are nontender bilaterally. There is no scoliosis, asymmetry, or musculoskeletal defect. 4. Right upper extremity - Full ROM. 5/5 muscle strength in all muscle groups. No pain or tenderness in shoulder, elbow, wrist, or hand. 5. Left upper extremity - Full ROM. 5/5 muscle strength in all muscle groups. No pain or tenderness in shoulder, elbow, wrist, or hand. 6. Right lower extremity - Full ROM. 5/5 muscle strength in all muscle groups. No pain, tenderness, or swelling in the hip, knee, ankle or foot. 7. Left lower extremity - Full ROM. 5/5 muscle strength in all muscle groups. No pain, tenderness, or swelling in the hip, knee, ankle or foot. Neurological:  1. Mental Status - Alert, awake and oriented. Speech is clear and appropriate. 2. Cranial Nerves - Extraocular muscles intact bilaterally. Cranial nerves II-XII grossly intact bilaterally. 3. Gait - Non-antalgic     Psychological:  1. Mood and affect - Appropriate. 2. Speech - Appropriate. 3. Though content - Appropriate. 4. Judgment - Appropriate. ASSESSMENT:      ICD-10-CM ICD-9-CM    1. Spondylosis of lumbar region without myelopathy or radiculopathy M47.816 721.3    2. Lumbar facet arthropathy M12.88 721.3    3. Spondylolisthesis, lumbar region M43.16 738.4    4. Chronic pain syndrome G89.4 338.4            PLAN:    1.    I have thoroughly discussed the risks and benefits, indications, contraindications, and side effects of any and procedures that were mentioned at today's patient visit.  I have used a skeleton model to explain all procedures, as well as to provide added emphasis regarding procedures and as well for patient education purposes. I have answered all questions in great detail, and I have obtained verbal confirmation for all procedures planned with the patient. 3.    I have reviewed in great detail today the patient's MRI and other imaging studies with the patient. I have explained to the patient their condition using both actual recent and relevant images insofar as I am able to obtain actual images. I have used a skeleton model for added emphasis as well as patient education. 4.    I have advised patient to have a primary care provider continue to care for their health maintenance and general medical conditions. 5,    I have placed appropriate referrals to specialty care providers as I have deemed necessary through today's clinical consultation with the patient. 5.    I have explained to the patient that if any significant side effects, issues, problems, concerns, or perceived complications may have arisen at around the time of the patient's procedures, they should either call the pain management clinic or go to the emergency room immediately for medical provider evaluation. 6.   I have encouraged all patients to call the pain management clinic with any questions or concerns that they may have pertaining to their procedures. DISPOSITION:   The patients condition and plan were discussed at length and all questions were answered. The patient agrees with the plan. A total of 15 minutes was spent with the patient of which over half of the time was spent counseling the patient. Daniel Driscoll MD 9/28/2017 5:55 PM    Note: Although these clinic notes were documented by the provider at the time of the exam, they have not been proofed and are subject to transcription variance.

## 2017-10-09 ENCOUNTER — OFFICE VISIT (OUTPATIENT)
Dept: ORTHOPEDIC SURGERY | Age: 62
End: 2017-10-09

## 2017-10-09 DIAGNOSIS — M17.0 PRIMARY OSTEOARTHRITIS OF BOTH KNEES: Primary | ICD-10-CM

## 2017-10-09 RX ORDER — HYALURONATE SODIUM 10 MG/ML
2 SYRINGE (ML) INTRAARTICULAR ONCE
Qty: 2 ML | Refills: 0
Start: 2017-10-09 | End: 2017-10-09

## 2017-10-09 NOTE — PROGRESS NOTES
Patient: Aron Taylor                MRN: [id number]       SSN: [id number]  YOB: 1955        AGE: 57 y.o. SEX: male  There is no height or weight on file to calculate BMI. PCP: Willy De La Garza MD  10/09/17    No chief complaint on file. HISTORY:  Aron Taylor is a 57 y.o. male who is seen for reevaluation of Bilateral knees here for 1st injection of euflexxa. PROCEDURE:  Patient's Bilateral knees, after timeout under sterile conditions, was injected with 2 cc of Euflexxa. [facility]  OFFICE PROCEDURE PROGRESS NOTE        Chart reviewed for the following:   Annabel PASTRANA PA-C, have reviewed the History, Physical and updated the Allergic reactions for [address] performed immediately prior to start of procedure:   Annabel PASTRANA PA-C, have performed the following reviews on Aron Taylor prior to the start of the procedure:            * Patient was identified by name and date of birth   * Agreement on procedure being performed was verified  * Risks and Benefits explained to the patient  * Procedure site verified and marked as necessary  * Patient was positioned for comfort  * Consent was signed and verified     Time: 1:08 PM       Date of procedure: 10/9/2017    Procedure performed by:  CAYLA Santamaria    Provider assisted by: None     How tolerated by patient: tolerated the procedure well with no complications    Comments: none    IMPRESSION:     ICD-10-CM ICD-9-CM    1. Primary osteoarthritis of both knees M17.0 715.16 NE DRAIN/INJECT LARGE JOINT/BURSA      EUFLEXXA INJECTION PER DOSE      sodium hyaluronate (SUPARTZ FX/HYALGAN/GENIVSC) 10 mg/mL syrg injection        PLAN:  Mr. Mekhi Gamez will return in one week for his second Euflexxa injection.       Annabel Riojas PA-C  [facility] and Spine Specialist

## 2017-10-16 ENCOUNTER — OFFICE VISIT (OUTPATIENT)
Dept: ORTHOPEDIC SURGERY | Age: 62
End: 2017-10-16

## 2017-10-16 VITALS
DIASTOLIC BLOOD PRESSURE: 78 MMHG | TEMPERATURE: 97.8 F | SYSTOLIC BLOOD PRESSURE: 125 MMHG | BODY MASS INDEX: 31.15 KG/M2 | OXYGEN SATURATION: 96 % | WEIGHT: 230 LBS | HEART RATE: 73 BPM | HEIGHT: 72 IN

## 2017-10-16 DIAGNOSIS — M17.0 PRIMARY OSTEOARTHRITIS OF BOTH KNEES: Primary | ICD-10-CM

## 2017-10-16 RX ORDER — HYALURONATE SODIUM 10 MG/ML
2 SYRINGE (ML) INTRAARTICULAR ONCE
Qty: 2 ML | Refills: 0
Start: 2017-10-16 | End: 2017-10-16

## 2017-10-16 NOTE — PROGRESS NOTES
Patient: Jenny Curtis                MRN: [id number]       SSN: [id number]  YOB: 1955        AGE: 57 y.o. SEX: male  Body mass index is 31.19 kg/(m^2). PCP: Wagner Pascual MD  10/16/17    Chief Complaint   Patient presents with    Knee Pain     BILAT       HISTORY:  Jenny Curtis is a 57 y.o. male who is seen for reevaluation of Bilateral knees here for 2nd injection of euflexxa. PROCEDURE:  Patient's Bilateral knee, after timeout under sterile conditions, was injected with 2 cc of Euflexxa. [facility]  OFFICE PROCEDURE PROGRESS NOTE        Chart reviewed for the following:   Jesse PASTRANA PA-C, have reviewed the History, Physical and updated the Allergic reactions for [address] performed immediately prior to start of procedure:   Jesse PASTRANA PA-C, have performed the following reviews on Jenny Curtis prior to the start of the procedure:            * Patient was identified by name and date of birth   * Agreement on procedure being performed was verified  * Risks and Benefits explained to the patient  * Procedure site verified and marked as necessary  * Patient was positioned for comfort  * Consent was signed and verified     Time: 2:16 PM    Date of procedure: 10/16/2017    Procedure performed by:  Mackenzie Ramirez PA-C    Provider assisted by: None     How tolerated by patient: tolerated the procedure well with no complications    Comments: none    IMPRESSION:     ICD-10-CM ICD-9-CM    1. Primary osteoarthritis of both knees M17.0 715.16         PLAN:  Mr. Martina Khalil will return in one week for his third Euflexxa injection.       Jesse Salinas, [facility] [facility] [address] and Spine Specialist

## 2017-10-23 ENCOUNTER — OFFICE VISIT (OUTPATIENT)
Dept: ORTHOPEDIC SURGERY | Age: 62
End: 2017-10-23

## 2017-10-23 VITALS
TEMPERATURE: 98 F | RESPIRATION RATE: 15 BRPM | BODY MASS INDEX: 31.69 KG/M2 | WEIGHT: 234 LBS | SYSTOLIC BLOOD PRESSURE: 122 MMHG | DIASTOLIC BLOOD PRESSURE: 84 MMHG | HEIGHT: 72 IN | HEART RATE: 76 BPM

## 2017-10-23 DIAGNOSIS — M17.0 PRIMARY OSTEOARTHRITIS OF BOTH KNEES: Primary | ICD-10-CM

## 2017-10-23 RX ORDER — HYALURONATE SODIUM 10 MG/ML
2 SYRINGE (ML) INTRAARTICULAR ONCE
Qty: 2 ML | Refills: 0
Start: 2017-10-23 | End: 2017-10-23

## 2017-10-23 NOTE — PROGRESS NOTES
Patient: Radha Gonzalez                MRN: [id number]       SSN: [id number]  YOB: 1955        AGE: 57 y.o. SEX: male  Body mass index is 31.74 kg/(m^2). PCP: Amita Lindsey MD  10/23/17    Chief Complaint   Patient presents with    Knee Pain     bilateral       HISTORY:  Radha Gonzalez is a 57 y.o. male who is seen for reevaluation of Bilateral knee here for 3rd and final injection of euflexxa. PROCEDURE:  Patient's Bilateral knee, after timeout under sterile conditions, was injected with 2 cc of Euflexxa. [facility]  OFFICE PROCEDURE PROGRESS NOTE        Chart reviewed for the following:   Karl PASTRANA PA-C, have reviewed the History, Physical and updated the Allergic reactions for [address] performed immediately prior to start of procedure:   Karl PASTRANA PA-C, have performed the following reviews on Radha Gonzalez prior to the start of the procedure:            * Patient was identified by name and date of birth   * Agreement on procedure being performed was verified  * Risks and Benefits explained to the patient  * Procedure site verified and marked as necessary  * Patient was positioned for comfort  * Consent was signed and verified     Time: 1:33 PM       Date of procedure: 10/23/2017    Procedure performed by:  Selena Luz PA-C  Provider assisted by: None     How tolerated by patient: tolerated the procedure well with no complications    Comments: none    IMPRESSION:     ICD-10-CM ICD-9-CM    1. Primary osteoarthritis of both knees M17.0 715.16 TX DRAIN/INJECT LARGE JOINT/BURSA      EUFLEXXA INJECTION PER DOSE        PLAN: Mr. Yajaira Sotckton has completed his Euflexxa injection series. he will return as needed.       Karl Tapia, [address] [facility] and Spine Specialist

## 2018-02-01 ENCOUNTER — OFFICE VISIT (OUTPATIENT)
Dept: ORTHOPEDIC SURGERY | Age: 63
End: 2018-02-01

## 2018-02-01 VITALS
OXYGEN SATURATION: 97 % | DIASTOLIC BLOOD PRESSURE: 75 MMHG | HEART RATE: 66 BPM | BODY MASS INDEX: 32.28 KG/M2 | SYSTOLIC BLOOD PRESSURE: 131 MMHG | TEMPERATURE: 97.5 F | WEIGHT: 238 LBS

## 2018-02-01 DIAGNOSIS — M25.511 RIGHT SHOULDER PAIN, UNSPECIFIED CHRONICITY: ICD-10-CM

## 2018-02-01 DIAGNOSIS — M75.101 TEAR OF RIGHT ROTATOR CUFF, UNSPECIFIED TEAR EXTENT: Primary | ICD-10-CM

## 2018-02-01 NOTE — PROGRESS NOTES
Robert Ritchie  1955   Chief Complaint   Patient presents with    Shoulder Pain     right        HISTORY OF PRESENT ILLNESS  Robert Ritchie is a 57 y.o. male who presents today for evaluation of right shoulder pain. Patient rates pain as 6/10 today. He has had pain since 2015. Patient has tried previous cortisone injections which provided a few months of relief. His last one was in August 2017. Patient has pain with certain movements of the arm, especially overhead. He has night pain. H/o of left shoulder repair. He reports crunching in the left shoulder. Patient denies any fever, chills, chest pain, shortness of breath or calf pain. There are no new illness or injuries to report since last seen in the office. There are no changes to medications, allergies, family or social history. PHYSICAL EXAM:   Visit Vitals    /75    Pulse 66    Temp 97.5 °F (36.4 °C) (Oral)    Wt 238 lb (108 kg)    SpO2 97%    BMI 32.28 kg/m2     The patient is a well-developed, well-nourished male   in no acute distress. The patient is alert and oriented times three. The patient is alert and oriented times three. Mood and affect are normal.  LYMPHATIC: lymph nodes are not enlarged and are within normal limits  SKIN: normal in color and non tender to palpation. There are no bruises or abrasions noted. NEUROLOGICAL: Motor sensory exam is within normal limits. Reflexes are equal bilaterally.  There is normal sensation to pinprick and light touch  MUSCULOSKELETAL:  Examination Right shoulder   Skin Intact   AC joint tenderness -   Biceps tenderness +   Forward flexion/Elevation    Active abduction    Glenohumeral abduction 90   External rotation ROM 90   Internal rotation ROM 70   Apprehension -   Simins Relocation -   Jerk -   Load and Shift -   Obriens -   Speeds -   Impingement sign +   Supraspinatus/Empty Can -, 5/5   External Rotation Strength -, 5/5   Lift Off/Belly Press -, 5/5   Neurovascular Intact         IMAGING: XR of the right shoulder dated 2/1/18 was reviewed and read: no acute abnormalities      IMPRESSION:      ICD-10-CM ICD-9-CM    1. Tear of right rotator cuff, unspecified tear extent M75.101 840.4 MRI SHOULDER RT WO CONT   2. Right shoulder pain, unspecified chronicity M25.511 719.41 AMB POC XRAY, SHOULDER; COMPLETE, 2+        PLAN:   1. Patient has had limited relief from previous cortisone injections. I am concerned that he could have damage to the right rotator cuff. Risk factors include: dm  2. No cortisone injection indicated today   3. No Physical/Occupational Therapy indicated today  4. Yes diagnostic test indicated today MRI R SHOULDER  5. No durable medical equipment indicated today  6. No referral indicated today   7. No medications indicated today  8. No Narcotic indicated today       RTC following MRI  Follow-up Disposition: Not on File    Scribed by Nitish Shoulder [address]) as dictated by Maryellen Bryan MD    I, Dr. Maryellen Bryan, confirm that all documentation is accurate.     Maryellen Bryan M.D.   [facility] and Spine Specialist

## 2018-02-02 ENCOUNTER — DOCUMENTATION ONLY (OUTPATIENT)
Dept: ORTHOPEDIC SURGERY | Age: 63
End: 2018-02-02

## 2018-02-15 DIAGNOSIS — M75.101 TEAR OF RIGHT ROTATOR CUFF, UNSPECIFIED TEAR EXTENT: ICD-10-CM

## 2018-02-19 ENCOUNTER — OFFICE VISIT (OUTPATIENT)
Dept: ORTHOPEDIC SURGERY | Age: 63
End: 2018-02-19

## 2018-02-19 VITALS
HEART RATE: 71 BPM | BODY MASS INDEX: 32.37 KG/M2 | DIASTOLIC BLOOD PRESSURE: 75 MMHG | OXYGEN SATURATION: 98 % | TEMPERATURE: 96.5 F | WEIGHT: 239 LBS | HEIGHT: 72 IN | SYSTOLIC BLOOD PRESSURE: 111 MMHG

## 2018-02-19 DIAGNOSIS — M75.41 IMPINGEMENT SYNDROME OF RIGHT SHOULDER: Primary | ICD-10-CM

## 2018-02-19 NOTE — PROGRESS NOTES
Duane Beal  1955   Chief Complaint   Patient presents with    Shoulder Pain     right shoulder post mri        HISTORY OF PRESENT ILLNESS  Duane Beal is a 57 y.o. male who presents today for evaluation of right shoulder pain and to review MRI results. Patient rates pain as 6/10 today. He has good ROM. Patient has some crunching sensations in the shoulder. Patient has tried previous cortisone injections which now give him minimal relief. . Patient denies any fever, chills, chest pain, shortness of breath or calf pain. There are no new illness or injuries to report since last seen in the office. There are no changes to medications, allergies, family or social history. PHYSICAL EXAM:   Visit Vitals    /75    Pulse 71    Temp 96.5 °F (35.8 °C)    Ht 6' (1.829 m)    Wt 239 lb (108.4 kg)    SpO2 98%    BMI 32.41 kg/m2     The patient is a well-developed, well-nourished male   in no acute distress. The patient is alert and oriented times three. The patient is alert and oriented times three. Mood and affect are normal.  LYMPHATIC: lymph nodes are not enlarged and are within normal limits  SKIN: normal in color and non tender to palpation. There are no bruises or abrasions noted. NEUROLOGICAL: Motor sensory exam is within normal limits. Reflexes are equal bilaterally.  There is normal sensation to pinprick and light touch  MUSCULOSKELETAL:  Examination Right shoulder   Skin Intact   AC joint tenderness -   Biceps tenderness +   Forward flexion/Elevation    Active abduction    Glenohumeral abduction 90   External rotation ROM 90   Internal rotation ROM 70   Apprehension -   Simins Relocation -   Jerk -   Load and Shift -   Obriens -   Speeds -   Impingement sign +   Supraspinatus/Empty Can -, 5/5   External Rotation Strength -, 5/5   Lift Off/Belly Press -, 5/5   Neurovascular Intact       IMAGING: MRI of the right shoulder dated 2/15/18 was reviewed and read: Impression:  1. Mild degenerative changes of the acromioclavicular joint. 2. No evidence of rotator cuff tear. XR of the right shoulder dated 2/1/18 was reviewed and read: no acute abnormalities      IMPRESSION:      ICD-10-CM ICD-9-CM    1. Impingement syndrome of right shoulder M75.41 726.2         PLAN:   1. I discussed the results of the MRI and the treatment options with the patient. I discussed the risks and benefits and potential adverse outcomes of both operative vs non operative treatment of right shoulder impingement syndrome with the patient. Patient wishes to proceed with right shoulder acromioplasty. Risks of operative intervention include but not limited to bleeding, infection, deep vein thrombosis, pulmonary embolism, death, limb length discrepancy, reflexive sympathetic dystrophy, fat embolism syndrome,damage to blood vessels and nerves, malunion, non-union, delayed union, failure of hardware, post traumatic arthritis, stroke, heart attack, and death. Patient understands that infection may arise and may require numerous surgeries. History and physical exam scheduled for a later date. Risk factors include: dm  2. No cortisone injection indicated today   3. No Physical/Occupational Therapy indicated today  4. No diagnostic test indicated today   5. No durable medical equipment indicated today  6. No referral indicated today   7. No medications indicated today  8. No Narcotic indicated today       RTC H&P  Follow-up Disposition: Not on File    Scribed by Jacksonpan Cedeño [facility]) as dictated by Zandra Mendoza MD    I, Dr. Zandra Mendoza, confirm that all documentation is accurate.     Zandra Mendoza M.D.   [facility] and Spine Specialist

## 2018-02-26 DIAGNOSIS — M75.41 IMPINGEMENT SYNDROME OF RIGHT SHOULDER: Primary | ICD-10-CM

## 2018-02-27 DIAGNOSIS — Z01.818 PREOP EXAMINATION: Primary | ICD-10-CM

## 2018-03-06 ENCOUNTER — OFFICE VISIT (OUTPATIENT)
Dept: ORTHOPEDIC SURGERY | Age: 63
End: 2018-03-06

## 2018-03-06 VITALS
BODY MASS INDEX: 32.51 KG/M2 | OXYGEN SATURATION: 99 % | SYSTOLIC BLOOD PRESSURE: 123 MMHG | HEIGHT: 72 IN | HEART RATE: 72 BPM | DIASTOLIC BLOOD PRESSURE: 71 MMHG | WEIGHT: 240 LBS | TEMPERATURE: 95.2 F

## 2018-03-06 DIAGNOSIS — M75.41 IMPINGEMENT SYNDROME OF RIGHT SHOULDER: Primary | ICD-10-CM

## 2018-03-06 RX ORDER — OXYCODONE AND ACETAMINOPHEN 5; 325 MG/1; MG/1
1-2 TABLET ORAL
Qty: 60 TAB | Refills: 0 | Status: SHIPPED | OUTPATIENT
Start: 2018-03-06 | End: 2018-12-18

## 2018-03-06 NOTE — PROGRESS NOTES
HISTORY AND PHYSICAL          Patient: Corazon Lepe                MRN: [id number]       SSN: [id number]  YOB: 1955          AGE: 70 y.o. SEX: male      Patient scheduled for:  Right shoulder arthroscopic acromioplasty    Surgeon: Rodríguez Marcelino MD    ANESTHESIA TYPE:  General    HISTORY:     The patient was seen in the office today for a preoperative history and physical for an upcoming above listed surgery. The patient is a pleasant 70 y.o. male who has a history of right shoulder pain. This has been long standing and gradually getting worse. Worse with overhead reaching and has pain at night. He has failed cortisone injections, NSAIDs and Home exercise plan . Pain level is a 6/10. Due to the current findings, affected activity of daily living and continued pain and discomfort, surgical intervention is indicated. The alternatives, risks, and complications, including but not limited to infection, blood loss, need for blood transfusion, neurovascular damage, waldo-incisional numbness, subcutaneous hematoma, bone fracture, anesthetic complications, DVT, PE, death, RSD, postoperative stiffness and pain, possible surgical scar, delayed healing and nonhealing, reflexive sympathetic dystrophy, damage to blood vessels and nerves, need for more surgery, MI, and stroke,  failure of hardware, gait disturbances,have been discussed. The patient understands and wishes to proceed with surgery. PAST MEDICAL HISTORY:     Past Medical History:   Diagnosis Date    BPH (benign prostatic hyperplasia)     Diabetes ([facility] [address]     Erectile dysfunction     Sleep apnea     Slow urinary stream        CURRENT MEDICATIONS:     Current Outpatient Prescriptions   Medication Sig Dispense Refill    oxyCODONE-acetaminophen (PERCOCET) 5-325 mg per tablet Take 1-2 Tabs by mouth every four (4) hours as needed for Pain. Max Daily Amount: 12 Tabs.  Do not take until after surgery 60 Tab 0    alfuzosin SR (UROXATRAL) 10 mg SR tablet Take 1 Tab by mouth daily (after dinner). 90 Tab 2    dutasteride (AVODART) 0.5 mg capsule Take 1 Cap by mouth daily (after dinner). 90 Cap 3    VOLTAREN 1 % gel       ascorbic acid, vitamin C, (VITAMIN C) 500 mg tablet Take  by mouth.  omega 3-dha-epa-fish oil (FISH OIL) 100-160-1,000 mg cap Take  by mouth.  GINKGO BILOBA/PANAX GINSENG RT (GINKOBA M-E PO) Take  by mouth.  sildenafil (REVATIO) 20 mg tablet Take 20 mg by mouth three (3) times daily.  metaxalone (SKELAXIN) 800 mg tablet TAKE 1 TABLET THREE TIMES A DAY 30 Tab 0    tadalafil (CIALIS) 20 mg tablet Take 1 Tab by mouth daily as needed. 6 Tab 11    FREESTYLE LITE STRIPS strip       piroxicam (FELDENE) 20 mg capsule   1    azelastine (OPTIVAR) 0.05 % ophthalmic solution   12    LIDOCAINE (LIDODERM EX) by Apply Externally route.  simvastatin (ZOCOR) 10 mg tablet Take  by mouth nightly.  Cetirizine (ZYRTEC) 10 mg cap Take  by mouth.  multivitamin (ONE A DAY) tablet Take 1 Tab by mouth daily.  aspirin delayed-release 81 mg tablet Take  by mouth daily.  cyanocobalamin (VITAMIN B-12) 1,000 mcg tablet Take 1,000 mcg by mouth daily.  calcium polycarbophil (FIBER LAXATIVE) 625 mg tablet Take 625 mg by mouth daily.          ALLERGIES:     Allergies   Allergen Reactions    Other Medication Unable to Obtain     Dust, mold and dander         SURGICAL HISTORY:     Past Surgical History:   Procedure Laterality Date    HX HERNIA REPAIR      HX KNEE ARTHROSCOPY Bilateral     HX ORTHOPAEDIC  2003 & 2004    torn meniscas right, calcium build up on left    HX OTHER SURGICAL      bone spurs in right ankle    [place] ANESTH,SURGERY OF SHOULDER         SOCIAL HISTORY:     Social History     Social History    Marital status:      Spouse name: N/A    Number of children: N/A    Years of education: N/A     Social History Main Topics    Smoking status: Former Smoker     Quit date: 6/10/1986    Smokeless tobacco: Never Used    Alcohol use No    Drug use: No    Sexual activity: Not Asked     Other Topics Concern    None     Social History Narrative       FAMILY HISTORY:     Family History   Problem Relation Age of Onset    Adopted: Yes       REVIEW OF SYSTEMS:     Negative for fevers, chills, chest pain, shortness of breath, weight loss, recent illness     General: Negative for fever and chills. No unexpected change in weight. Denies fatigue. No change in appetite. Skin: Negative for rash or itching. HEENT: Negative for congestion, sore throat, neck pain and neck stiffness. No change in vision or hearing. Hasn't noted any enlarged lymph nodes in the neck. Cardiovascular:  Negative for chest pain and palpitations. Has not noted pedal edema. Respiratory: Negative for cough, colds, sinus, hemoptysis, shortness of breath and wheezing. Gastrointestinal: Negative for nausea and vomiting, rectal bleeding, coffee ground emesis, abdominal pain, diarrhea and constipation. Genitourinary: Negative for dysuria, frequency urgency, or burning on micturition. No flank pain, no foul smelling urine, no difficulty with initiating urination. Hematological: Negative for bleeding or easy bruising. Musculoskeletal: Negative  for arthralgias, back pain or neck pain. Neurological: Negative for dizziness, seizures or syncopal episodes. Denies headaches. Endocrine: Denies excessive thirst.  No heat/cold intolerance. Psychiatric: Negative for depression or insomnia. PHYSICAL EXAMINATION:     VITALS:   Visit Vitals    /71    Pulse 72    Temp 95.2 °F (35.1 °C)    Ht 6' (1.829 m)    Wt 240 lb (108.9 kg)    SpO2 99%    BMI 32.55 kg/m2     GEN:  Well developed, well nourished 70 y.o. male in no acute distress. HEENT: Normocephalic and atraumatic. Eyes: Conjunctivae and EOM are normal.Pupils are equal, round, and reactive to light.  External ear normal appearance, external nose normal appearing. Mouth/Throat: Oropharynx is clear and moist, able to handle oral secretions w/out difficulty, airway patent  NECK: Supple. Normal ROM, No lymphadenopathy. Trachea is midline. No bruising, swelling or deformity  RESP: Clear to auscultation bilaterally. No wheezes, rales, rhonchi. Normal effort and breath sounds. No respiratory distress  CARDIO:  Normal rate, regular rhythm and normal heart sounds. No MGR. ABDOMEN: Soft, non-tender, non-distended, normoactive bowel sounds in all four quadrants. There is no tenderness. There is no rebound and no guarding. BACK: No CVA or spinal tenderness  BREAST:  Deferred  PELVIC:    Deferred   RECTAL:  Deferred   :           Deferred  EXTREMITIES: EXAMINATION OF: right shoulder  Examination Right shoulder   Skin Intact   AC joint tenderness -   Biceps tenderness -   Forward flexion/Elevation    Active abduction    Glenohumeral abduction 90   External rotation ROM 90   Internal rotation ROM 70   Apprehension -   Simins Relocation -   Jerk -   Load and Shift -   Obriens -   Speeds -   Impingement sign +   Supraspinatus/Empty Can -, 5/5   External Rotation Strength -, 5/5   Lift Off/Belly Press -, 5/5   Neurovascular Intact       NEUROVASCULAR: Sensation intact to light touch and strength grossly intact and symmetrical. No nystagmus. Positive distal pulses and capillary refill. DVT ASSESSMENT:  There is not  calf tenderness. No evidence of DVT seen on physical exam.  MOTOR: In tact  PSYCH: Alert an oriented to person, place and time. Mood, memory, affect, behavior and judgment normal       RADIOGRAPHS & DIAGNOSTIC STUDIES:     MRI/xray reveals : MRI of the right shoulder dated 2/15/18 was reviewed and read: Impression:  1. Mild degenerative changes of the acromioclavicular joint. 2. No evidence of rotator cuff tear. LABS:       Labs and ekg scanned and wnl. ASSESSMENT:       Encounter Diagnosis   Name Primary?     Impingement syndrome of right shoulder Yes       PLAN:     Again, the alternatives, risks, and complications, as well as expected outcome were discussed. The patient understands and agrees to proceed with right shoulder arthroscopic subacromial decompression.  Patient given orders listed below:    Orders Placed This Encounter    oxyCODONE-acetaminophen (PERCOCET) 5-325 mg per tablet         Allyson Santos PA-C  3/6/2018  9:53 AM

## 2018-03-14 ENCOUNTER — TELEPHONE (OUTPATIENT)
Dept: ORTHOPEDIC SURGERY | Age: 63
End: 2018-03-14

## 2018-03-14 DIAGNOSIS — M75.41 IMPINGEMENT SYNDROME OF RIGHT SHOULDER: Primary | ICD-10-CM

## 2018-03-14 NOTE — TELEPHONE ENCOUNTER
[facility] at the [facility] location called requesting the order for physical therapy for the patient be faxed to them at [phone]. Please advise.

## 2018-03-16 ENCOUNTER — HOSPITAL ENCOUNTER (OUTPATIENT)
Dept: PHYSICAL THERAPY | Age: 63
Discharge: HOME | End: 2018-03-16
Payer: MEDICARE

## 2018-03-16 PROCEDURE — 97161 PT EVAL LOW COMPLEX 20 MIN: CPT

## 2018-03-16 PROCEDURE — 97110 THERAPEUTIC EXERCISES: CPT

## 2018-03-16 PROCEDURE — G8985 CARRY GOAL STATUS: HCPCS

## 2018-03-16 PROCEDURE — G8984 CARRY CURRENT STATUS: HCPCS

## 2018-03-16 NOTE — PROGRESS NOTES
PT DAILY TREATMENT NOTE - [facility]     Patient Name: Mahi Tan  Date:3/16/2018  : 1955  [x]  Patient  Verified  Payor: Trish Moore / Plan: [place] MEDICARE PART A & B / Product Type: Medicare /    In time:930  Out time:  Total Treatment Time (min): 40  Total Timed Codes (min): 10  1:1 Treatment Time ( [address] only): 40   Visit #: 1 of 12    Treatment Area:  Impingement syndrome of right shoulder [M75.41]    SUBJECTIVE  Pain Level (0-10 scale): 0  Any medication changes, allergies to medications, adverse drug reactions, diagnosis change, or new procedure performed?: [x] No    [] Yes (see summary sheet for update)  Subjective functional status/changes:   [] No changes reported       OBJECTIVE    Modality rationale:    Min Type Additional Details    [] Estim:  []Unatt       []IFC  []Premod                        []Other:  []w/ice   []w/heat  Position:  Location:    [] Estim: []Att    []TENS instruct  []NMES                    []Other:  []w/US   []w/ice   []w/heat  Position:  Location:    []  Traction: [] Cervical       []Lumbar                       [] Prone          []Supine                       []Intermittent   []Continuous Lbs:  [] before manual  [] after manual    []  Ultrasound: []Continuous   [] Pulsed                           []1MHz   []3MHz W/cm2:  Location:    []  Iontophoresis with dexamethasone         Location: [] Take home patch   [] In clinic    []  Ice     []  heat  []  Ice massage  []  Laser   []  Anodyne Position:  Location:    []  Laser with stim  []  Other:  Position:  Location:    []  Vasopneumatic Device Pressure:       [] lo [] med [] hi   Temperature: [] lo [] med [] hi   [] Skin assessment post-treatment:  []intact []redness- no adverse reaction    []redness  adverse reaction:     30 min [x]Eval                  []Re-Eval       10 min Therapeutic Exercise:  [] See flow sheet : HEP   Rationale: increase ROM to improve the patients ability to perform future tasks            With   [] TE   [] TA   [] neuro   [] other: Patient Education: [x] Review HEP    [] Progressed/Changed HEP based on:   [] positioning   [] body mechanics   [] transfers   [] heat/ice application    [] other:      Other Objective/Functional Measures:       Pain Level (0-10 scale) post treatment: 0    ASSESSMENT/Changes in Function:      Patient will continue to benefit from skilled PT services to modify and progress therapeutic interventions, address functional mobility deficits, address ROM deficits, address strength deficits and analyze and address soft tissue restrictions to attain remaining goals.      []  See Plan of Care  []  See progress note/recertification  []  See Discharge Summary         Progress towards goals / Updated goals:  Per POC    PLAN  []  Upgrade activities as tolerated     [x]  Continue plan of care  []  Update interventions per flow sheet       []  Discharge due to:_  []  Other:_      Kyle Hickman, PT 3/16/2018  10:35 AM    Future Appointments  Date Time Provider Cristhian Knight   3/19/2018 11:30 AM Ragena Wilda [facility]   3/22/2018 9:45 AM CAYLA Estrella [facility]   3/22/2018 12:00 PM Ragena Wilda [facility]   3/23/2018 11:00 AM Rah Ahuja, PT [facility]   3/26/2018 12:00 PM Ludmila Nakmamadou [facility]   3/27/2018 11:00 AM Kyle Hickman, PT [facility]   3/30/2018 12:30 PM Ludmilatali Lamb [facility]   4/2/2018 12:00 PM Ragena Wilda [facility]   4/4/2018 11:30 AM Rah Ahuja, PT [facility]   4/6/2018 11:30 AM Rah Ahuja, PT [facility]   4/9/2018 12:00 PM Ragena Wilda [facility]   4/11/2018 10:45 AM Bindu Pendleton MD [facility] Sharpe   4/11/2018 12:00 PM Rah Ahuja, PT [facility]   4/13/2018 12:00 PM Rah Ahuja, PT [facility]

## 2018-03-16 NOTE — PROGRESS NOTES
[facility]  [address]  [place], [address]  [phone] fax    Plan of Care/ Statement of Necessity for Physical Therapy Services    Patient name: Kalie Monique Start of Care: 3/16/2018   Referral source: Minerva Merlos,* : 1955    Medical Diagnosis: Impingement syndrome of right shoulder [M75.41]   Onset TVYP:    Treatment Diagnosis: s/p right RTC repair, acromioplasty   Prior Hospitalization: see medical history Provider#: [id number]   Medications: Verified on Patient summary List    Comorbidities: DM, L shoulder surgery, OA   Prior Level of Function: functionally I with all activities       The Plan of Care and following information is based on the information from the initial evaluation. Assessment/ ruiz information: 71 y/o male presents s/p right RTC repair and acromioplasty as noted above. Bulky dressing is still in place and pt and his wife were educated in removal of dressing tomorrow. Pt reports he does not yet have full feeling in the right UE from his nerve block. Pt was educated in all precautions and demonstrates understanding. Right shoulder PROM flexion to 90 degrees, ER to 10 degrees, IR to 40 degrees. Elbow, wrist and hand WFL. Pt will benefit from PT to address the aforementioned impairments.      Evaluation Complexity History MEDIUM  Complexity : 1-2 comorbidities / personal factors will impact the outcome/ POC ; Examination LOW Complexity : 1-2 Standardized tests and measures addressing body structure, function, activity limitation and / or participation in recreation  ;Presentation LOW Complexity : Stable, uncomplicated  ;Clinical Decision Making HIGH Complexity : FOTO score of 1- 25   Overall Complexity Rating: LOW   Problem List: pain affecting function, decrease ROM, decrease strength, decrease ADL/ functional abilitiies, decrease activity tolerance and decrease flexibility/ joint mobility   Treatment Plan may include any combination of the following: Therapeutic exercise, Therapeutic activities, Neuromuscular re-education, Physical agent/modality, Manual therapy, Patient education and Self Care training  Patient / Family readiness to learn indicated by: asking questions, trying to perform skills and interest  Persons(s) to be included in education: patient (P)  Barriers to Learning/Limitations: None  Patient Goal (s): To get back full function  Patient Self Reported Health Status: good  Rehabilitation Potential: good    Short Term Goals: To be accomplished in 1 weeks:   1. Pt will be I and compliant with HEP  Long Term Goals: To be accomplished in 4 weeks:   1. Improve PROM right shoulder flexion to 140 degrees for improved ability for future tasks   2. Improve PROM right shoulder PROM IR to 45 degrees for improved ability for future tasks   3. Improve FOTO to predicted outcome for improved ability for daily tasks. 4. Improve PROM ER to 30 degrees for improved ability for daily tasks. Frequency / Duration: Patient to be seen 2-3 times per week for 4 weeks. Patient/ Caregiver education and instruction: Diagnosis, prognosis, self care, activity modification, brace/ splint application and exercises   [x]  Plan of care has been reviewed with PTA    G-Codes (GP)    Carry   Current  CM= 80-99%    Goal  CK= 40-59%      The severity rating is based on clinical judgment and the FOTO score. Certification Period: 3-16-18 to 06-13-18  Ola Lesches, PT 3/16/2018 10:44 AM    ________________________________________________________________________    I certify that the above Therapy Services are being furnished while the patient is under my care. I agree with the treatment plan and certify that this therapy is necessary.     [de-identified] Signature:____________________  Date:____________Time: _________    Please sign and return to [facility] [address]  [address] [place] [address] [address]  [phone] fax

## 2018-03-19 ENCOUNTER — HOSPITAL ENCOUNTER (OUTPATIENT)
Dept: PHYSICAL THERAPY | Age: 63
Discharge: HOME | End: 2018-03-19
Payer: MEDICARE

## 2018-03-19 PROCEDURE — 97140 MANUAL THERAPY 1/> REGIONS: CPT

## 2018-03-19 PROCEDURE — 97110 THERAPEUTIC EXERCISES: CPT

## 2018-03-19 NOTE — PROGRESS NOTES
PT DAILY TREATMENT NOTE     Patient Name: Asad Almanza  Date:3/19/2018  : 1955  [x]  Patient  Verified  Payor: VA MEDICARE / Plan: [place] MEDICARE PART A & B / Product Type: Medicare /    In time:11:30am  Out time:11:58am  Total Treatment Time (min): 28  Visit #: 2 of 12    Treatment Area: Impingement syndrome of right shoulder [M75.41]    SUBJECTIVE  Pain Level (0-10 scale): 0  Any medication changes, allergies to medications, adverse drug reactions, diagnosis change, or new procedure performed?: [x] No    [] Yes (see summary sheet for update)  Subjective functional status/changes:   [] No changes reported  Pt reports minimal need for pain medication this weekend. OBJECTIVE    10 min Therapeutic Exercise:  [x] See flow sheet :   Rationale: increase ROM to improve the patients ability to reduce risk of contracture. 8 min Manual Therapy:  PROM R Shoulder, GHJ circles, STJ mobility   Rationale: decrease pain, increase ROM and increase tissue extensibility to improve ease of ADLs and reduce risk of contracture. Modality rationale: decrease inflammation and decrease pain to improve the patients ability to improve ease of daily activity.    Min Type Additional Details    [] Estim:  []Unatt       []IFC  []Premod                        []Other:  []w/ice   []w/heat  Position:  Location:    [] Estim: []Att    []TENS instruct  []NMES                    []Other:  []w/US   []w/ice   []w/heat  Position:  Location:    []  Traction: [] Cervical       []Lumbar                       [] Prone          []Supine                       []Intermittent   []Continuous Lbs:  [] before manual  [] after manual    []  Ultrasound: []Continuous   [] Pulsed                           []1MHz   []3MHz Location:  W/cm2:    []  Iontophoresis with dexamethasone         Location: [] Take home patch   [] In clinic    []  Ice     []  heat  []  Ice massage  []  Laser   []  Anodyne Position:  Location:    []  Laser with stim  [] Other: Position:  Location:   10 [x]  Vasopneumatic Device Pressure:       [x] lo [] med [] hi   Temperature: [x] lo [] med [] hi   [] Skin assessment post-treatment:  []intact []redness- no adverse reaction    []redness  adverse reaction:           With   [] TE   [] TA   [] neuro   [] other: Patient Education: [x] Review HEP    [] Progressed/Changed HEP based on:   [] positioning   [] body mechanics   [] transfers   [] heat/ice application    [] other:      Other Objective/Functional Measures: PROM progressing well, flexion ~100 degrees void of pain or end feel     Pain Level (0-10 scale) post treatment: 0    ASSESSMENT/Changes in Function: Pt demonstrates good PROM at present. Advised to continue gentle PROM for HEP and avoid stressing shoulder. Pt verbalized understanding. Patient will continue to benefit from skilled PT services to modify and progress therapeutic interventions, address functional mobility deficits, address ROM deficits, address strength deficits, analyze and address soft tissue restrictions and instruct in home and community integration to attain remaining goals. []  See Plan of Care  []  See progress note/recertification  []  See Discharge Summary         Progress towards goals / Updated goals:  Short Term Goals: To be accomplished in 1 weeks:                        1. Pt will be I and compliant with HEP  Long Term Goals: To be accomplished in 4 weeks:                        1. Improve PROM right shoulder flexion to 140 degrees for improved ability for future tasks                        2. Improve PROM right shoulder PROM IR to 45 degrees for improved ability for future tasks                        3. Improve FOTO to predicted outcome for improved ability for daily tasks. 4. Improve PROM ER to 30 degrees for improved ability for daily tasks.     PLAN  []  Upgrade activities as tolerated     [x]  Continue plan of care  []  Update interventions per flow sheet []  Discharge due to:_  []  Other:_      Ludmila Lamb, PT, DPT, ATC 3/19/2018  11:29 AM    Future Appointments  Date Time Provider Cristhian Knight   3/19/2018 11:30 AM Ragena Wilda [facility]   3/22/2018 9:45 AM CAYLA Estrella [facility]   3/22/2018 12:00 PM Ragena Wilda [facility]   3/23/2018 11:00 AM Rah Ahuja, PT [facility]   3/26/2018 12:00 PM Ragena Wilda [facility]   3/27/2018 11:00 AM Kyle Hickman, PT [facility]   3/30/2018 12:30 PM Ragena Wilda [facility]   4/2/2018 12:00 PM Ragena Wilda [facility]   4/4/2018 11:30 AM Rah Ahuja, PT [facility]   4/6/2018 11:30 AM Rha Ahuja, PT [facility]   4/9/2018 12:00 PM Ragena Wilda [facility]   4/11/2018 10:45 AM Bindu Pendleton MD [facility] Sharpe   4/11/2018 3:00 PM Rah Ahuja, PT [facility]   4/13/2018 12:00 PM Rah Ahuja, PT [facility]

## 2018-03-22 ENCOUNTER — HOSPITAL ENCOUNTER (OUTPATIENT)
Dept: PHYSICAL THERAPY | Age: 63
Discharge: HOME | End: 2018-03-22
Payer: MEDICARE

## 2018-03-22 ENCOUNTER — OFFICE VISIT (OUTPATIENT)
Dept: ORTHOPEDIC SURGERY | Age: 63
End: 2018-03-22

## 2018-03-22 VITALS
OXYGEN SATURATION: 98 % | HEIGHT: 72 IN | SYSTOLIC BLOOD PRESSURE: 125 MMHG | WEIGHT: 238.6 LBS | TEMPERATURE: 96.2 F | HEART RATE: 71 BPM | BODY MASS INDEX: 32.32 KG/M2 | DIASTOLIC BLOOD PRESSURE: 77 MMHG

## 2018-03-22 DIAGNOSIS — M75.121 COMPLETE TEAR OF RIGHT ROTATOR CUFF: Primary | ICD-10-CM

## 2018-03-22 PROCEDURE — 97016 VASOPNEUMATIC DEVICE THERAPY: CPT

## 2018-03-22 PROCEDURE — 97140 MANUAL THERAPY 1/> REGIONS: CPT

## 2018-03-22 NOTE — PROGRESS NOTES
PT DAILY TREATMENT NOTE - [facility]     Patient Name: Yolanda Tapia  Date:3/22/2018  : 1955  [x]  Patient  Verified  Payor: VA MEDICARE / Plan: VA MEDICARE PART A & B / Product Type: Medicare /    In time: 12:00pm  Out time:12:26pm  Total Treatment Time (min): 26  Total Timed Codes (min): 16  1:1 Treatment Time ( only): 16   Visit #: 3 of     Treatment Area: Right shoulder pain [M25.511]    SUBJECTIVE  Pain Level (0-10 scale): 0  Any medication changes, allergies to medications, adverse drug reactions, diagnosis change, or new procedure performed?: [x] No    [] Yes (see summary sheet for update)  Subjective functional status/changes:   [] No changes reported  Pt reports he f/u with his orthopaedic office and was allowed to relinquish use of the abd pillow, but is to continue sling use 3 more weeks. OBJECTIVE  8 min Therapeutic Exercise:  [x] See flow sheet :   Rationale: increase ROM and increase strength to improve the patients ability to improve ease of ADLs. 8 min Manual Therapy:  PROM R shoulder ,GHJ circles, STJ mobility   Rationale: decrease pain, increase ROM and increase tissue extensibility to improve ease of ADLs and reduce risk of contracture. Modality rationale: decrease inflammation to improve the patients ability to improve ease of ADLs.    Min Type Additional Details    [] Estim:  []Unatt       []IFC  []Premod                        []Other:  []w/ice   []w/heat  Position:  Location:    [] Estim: []Att    []TENS instruct  []NMES                    []Other:  []w/US   []w/ice   []w/heat  Position:  Location:    []  Traction: [] Cervical       []Lumbar                       [] Prone          []Supine                       []Intermittent   []Continuous Lbs:  [] before manual  [] after manual    []  Ultrasound: []Continuous   [] Pulsed                           []1MHz   []3MHz Location:  W/cm2:    []  Iontophoresis with dexamethasone         Location: [] Take home patch   [] In clinic    []  Ice     []  heat  []  Ice massage  []  Laser   []  Anodyne Position:  Location:    []  Laser with stim  []  Other: Position:  Location:   10 [x]  Vasopneumatic Device Pressure:       [x] lo [] med [] hi   Temperature: [x] lo [] med [] hi   [] Skin assessment post-treatment:  []intact []redness- no adverse reaction    []redness  adverse reaction:     With   [] TE   [] TA   [] neuro   [] other: Patient Education: [x] Review HEP    [] Progressed/Changed HEP based on:   [] positioning   [] body mechanics   [] transfers   [] heat/ice application    [] other:      Other Objective/Functional Measures: PROM R Shoulder: Flexion 135 void of pain or end feel or resistance,  void of pain or end feel or resistance, ER in scaption 30 degrees void of pain, end feel, or resistance, IR in scaption 60 degrees void of pain or end feel     Pain Level (0-10 scale) post treatment: 0    ASSESSMENT/Changes in Function: Pt has progressed very well with PROM and is progressing towards WNL. Held assessment of PROM above ranges listed above to protect repair as pt demonstrates no end feel in all planes. Advised pt to cancel PT tomorrow and decrease frequency to 2x/week. If he continues to demonstrate good PROM void of adverse response next visit, will reduce to 1x/week or 0/week until allowed to progress to [facility] (discussed this with pt, who is agreeable). Advised him to continue with shoulder precautions and continue his HEP. Patient will continue to benefit from skilled PT services to modify and progress therapeutic interventions, address functional mobility deficits, address ROM deficits, address strength deficits, analyze and address soft tissue restrictions, analyze and cue movement patterns, analyze and modify body mechanics/ergonomics and instruct in home and community integration to attain remaining goals.      []  See Plan of Care  []  See progress note/recertification  []  See Discharge Summary Progress towards goals / Updated goals:  Short Term Goals: To be accomplished in 1 weeks:                        1. Pt will be I and compliant with HEP Pt reports compliance 3/22/2018  Long Term Goals: To be accomplished in 4 weeks:                        0. Improve PROM right shoulder flexion to 140 degrees for improved ability for future tasks Near met, held further ROM to protect repair integrity.                       0. Improve PROM right shoulder PROM IR to 45 degrees for improved ability for future tasks Met 60 3/22/2018                        3. Improve FOTO to predicted outcome for improved ability for daily tasks.                        4. Improve PROM ER to 30 degrees for improved ability for daily tasks. Met 3/22/2018    PLAN  []  Upgrade activities as tolerated     [x]  Continue plan of care  []  Update interventions per flow sheet       []  Discharge due to:_  [x]  Other:_Decrease frequency to 1x/week or less if pt still doing exceedingly well.       Jam Mendez, PT, DPT, ATC 3/22/2018  12:05 PM    Future Appointments  Date Time Provider Cristhian Antonia   3/23/2018 11:00 AM Mac Christian PT [facility]   3/26/2018 12:00 PM [address] [facility]   3/27/2018 11:00 AM Kun Estrada PT [facility]   3/30/2018 12:30 PM [address] [facility]   4/2/2018 12:00 PM [address] [facility]   4/4/2018 11:30 AM Mac Christian PT [facility]   4/6/2018 11:30 AM Mac Christian PT [facility]   4/9/2018 12:00 PM Jam [facility]   4/11/2018 10:45 AM Dora Sorensen MD [address] [facility]   4/11/2018 3:00 PM [facility]   4/12/2018 9:30 AM CAYLA Zhang [facility]   4/13/2018 12:00 PM Mac Christian PT [facility]

## 2018-03-22 NOTE — PROGRESS NOTES
Juan Balderrama  1955     HISTORY OF PRESENT ILLNESS  Juan Balderrama is a 70 y.o. male who presents today for evaluation s/p Right shoulder arthroscopic rotator cuff repair on 3/15/18. Patient has been going to PT. Describes pain as a 0/10. Has been taking tylenol for pain. Patient denies any fever, chills, chest pain, shortness of breath or calf pain. There are no new illness or injuries to report since last seen in the office. PHYSICAL EXAM:   Visit Vitals    /77    Pulse 71    Temp 96.2 °F (35.7 °C)    Ht 6' (1.829 m)    Wt 238 lb 9.6 oz (108.2 kg)    SpO2 98%    BMI 32.36 kg/m2      The patient is a well-developed, well-nourished male in no acute distress. The patient is alert and oriented times three. The patient appears to be well groomed. Mood and affect are normal.  ORTHOPEDIC EXAM of right shoulder:  Inspection: swelling not present,  Bruising not present  Incision, clean, dry, intact, sutures in place  Passive glenohumeral abduction 0-60 degrees  Stability: Stable  Strength: n/a  2+ distal pulses    IMPRESSION:  S/P Right shoulder arthroscopic rotator cuff repair    PLAN:   Incisions cleaned. Surgery was discussed at length today. Patient to continue with PROM and PT. Continue wearing sling. Stressed to patient that nothing causes an increase in pain. WIll show patient his pictures at next visit. Sling x 5 weeks likely.   RTC 3 weeks    HEATHER [facility]

## 2018-03-23 ENCOUNTER — APPOINTMENT (OUTPATIENT)
Dept: PHYSICAL THERAPY | Age: 63
End: 2018-03-23
Payer: MEDICARE

## 2018-03-26 ENCOUNTER — HOSPITAL ENCOUNTER (OUTPATIENT)
Dept: PHYSICAL THERAPY | Age: 63
Discharge: HOME | End: 2018-03-26
Payer: MEDICARE

## 2018-03-26 ENCOUNTER — DOCUMENTATION ONLY (OUTPATIENT)
Dept: ORTHOPEDIC SURGERY | Age: 63
End: 2018-03-26

## 2018-03-26 PROCEDURE — 97016 VASOPNEUMATIC DEVICE THERAPY: CPT

## 2018-03-26 PROCEDURE — 97110 THERAPEUTIC EXERCISES: CPT

## 2018-03-26 NOTE — PROGRESS NOTES
PT DAILY TREATMENT NOTE - [facility]     Patient Name: Pierce Sesay  Date:3/26/2018  : 1955  [x]  Patient  Verified  Payor: [place] MEDICARE / Plan: VA MEDICARE PART A & B / Product Type: Medicare /    In time:12:00pm  Out time:12:27pm  Total Treatment Time (min): 27  Total Timed Codes (min): 17  1:1 Treatment Time ( only): 14   Visit #: 4 of     Treatment Area: Right shoulder pain [M25.511]    SUBJECTIVE  Pain Level (0-10 scale): 0  Any medication changes, allergies to medications, adverse drug reactions, diagnosis change, or new procedure performed?: [x] No    [] Yes (see summary sheet for update)  Subjective functional status/changes:   [x] No changes reported    OBJECTIVE  17 min Therapeutic Exercise:  [x] See flow sheet :   Rationale: increase ROM to improve the patients ability to improve ease of ADLs and reduce risk of contracture. Modality rationale: decrease inflammation and decrease soreness to improve the patients ability to improve ease of ADLs.    Min Type Additional Details    [] Estim:  []Unatt       []IFC  []Premod                        []Other:  []w/ice   []w/heat  Position:  Location:    [] Estim: []Att    []TENS instruct  []NMES                    []Other:  []w/US   []w/ice   []w/heat  Position:  Location:    []  Traction: [] Cervical       []Lumbar                       [] Prone          []Supine                       []Intermittent   []Continuous Lbs:  [] before manual  [] after manual    []  Ultrasound: []Continuous   [] Pulsed                           []1MHz   []3MHz Location:  W/cm2:    []  Iontophoresis with dexamethasone         Location: [] Take home patch   [] In clinic    []  Ice     []  heat  []  Ice massage  []  Laser   []  Anodyne Position:  Location:    []  Laser with stim  []  Other: Position:  Location:    []  Vasopneumatic Device Pressure:       [] lo [] med [] hi   Temperature: [] lo [] med [] hi   [] Skin assessment post-treatment:  []intact []redness- no adverse reaction    []redness  adverse reaction:           With   [] TE   [] TA   [] neuro   [] other: Patient Education: [x] Review HEP    [] Progressed/Changed HEP based on:   [] positioning   [] body mechanics   [] transfers   [] heat/ice application    [] other:      Other Objective/Functional Measures: manual held 2/2 lack of pain and PROM progressing towards WNL     Pain Level (0-10 scale) post treatment: 0    ASSESSMENT/Changes in Function: Pt continues to demonstrate good PROM void of pain. At this time, he has been instructed to decrease PT frequency to 1/week and to continue HEP several times daily to maintain PROM. Will plan to increase frequency PRN upon progression to AAROM/AROM. Patient will continue to benefit from skilled PT services to modify and progress therapeutic interventions, address functional mobility deficits, address ROM deficits, address strength deficits, analyze and address soft tissue restrictions, analyze and cue movement patterns, analyze and modify body mechanics/ergonomics and instruct in home and community integration to attain remaining goals. []  See Plan of Care  []  See progress note/recertification  []  See Discharge Summary         Progress towards goals / Updated goals:  Short Term Goals: To be accomplished in 1 weeks:                        1. Pt will be I and compliant with HEP Pt reports compliance 3/22/2018  Long Term Goals: To be accomplished in 4 weeks:                        6. Improve PROM right shoulder flexion to 140 degrees for improved ability for future tasks Near met, held further ROM to protect repair integrity.                       5. Improve PROM right shoulder PROM IR to 45 degrees for improved ability for future tasks Met 60 3/22/2018                        3. Improve FOTO to predicted outcome for improved ability for daily tasks.                        4. Improve PROM ER to 30 degrees for improved ability for daily tasks.  Met 3/22/2018    PLAN  []  Upgrade activities as tolerated     [x]  Continue plan of care  []  Update interventions per flow sheet       []  Discharge due to:_  [x]  Other:_ Now decreased frequency to 1x/week until progressing to AAROM/AROM.      Mouna Billings, PT, DPT, ATC 3/26/2018  12:21 PM    Future Appointments  Date Time Provider Cristhian Knight   3/27/2018 11:00 AM Willy Lizarraga, PT [facility]   3/30/2018 12:30 PM [address] [facility]   4/2/2018 12:00 PM [address] [facility]   4/4/2018 11:30 AM Kim Meals, PT [facility]   4/6/2018 11:30 AM Kim Meals, PT [facility]   4/9/2018 12:00 PM Mouna Billings [facility]   4/11/2018 10:45 AM Celia Peck MD [facility]   4/11/2018 3:00 PM Kim Meals, PT [facility]   4/12/2018 9:30 AM CAYLA Gardner [facility]   4/13/2018 12:00 PM Kim Meals, PT [facility]   12/11/2018 9:30 AM [facility] NURSE [facility] SCHED   12/18/2018 1:45 PM Antonio Rock MD [facility]

## 2018-03-26 NOTE — PROGRESS NOTES
Pt dropped off disability paperwork on 3/23/18 @ the [address] [place] [address] location. Please advise pt when complete @ [phone].

## 2018-03-30 ENCOUNTER — HOSPITAL ENCOUNTER (OUTPATIENT)
Dept: PHYSICAL THERAPY | Age: 63
End: 2018-03-30
Payer: MEDICARE

## 2018-04-02 ENCOUNTER — HOSPITAL ENCOUNTER (OUTPATIENT)
Dept: PHYSICAL THERAPY | Age: 63
Discharge: HOME | End: 2018-04-02
Payer: MEDICARE

## 2018-04-02 PROCEDURE — 97110 THERAPEUTIC EXERCISES: CPT

## 2018-04-02 PROCEDURE — 97016 VASOPNEUMATIC DEVICE THERAPY: CPT

## 2018-04-02 NOTE — PROGRESS NOTES
PT DAILY TREATMENT NOTE - [facility]     Patient Name: Coco Beaulieu  Date:2018  : 1955  [x]  Patient  Verified  Payor: VA MEDICARE / Plan: VA MEDICARE PART A & B / Product Type: Medicare /    In time:11:53am  Out time:12:18pm  Total Treatment Time (min): 25  Total Timed Codes (min): 15  1:1 Treatment Time ( only): 15   Visit #:  of 5    Treatment Area: Right shoulder pain [M25.511]    SUBJECTIVE  Pain Level (0-10 scale): 8-12  Any medication changes, allergies to medications, adverse drug reactions, diagnosis change, or new procedure performed?: [x] No    [] Yes (see summary sheet for update)  Subjective functional status/changes:   [x] No changes reported    OBJECTIVE    15 min Therapeutic Exercise:  [x] See flow sheet :   Rationale: increase ROM to improve the patients ability to reduce risk of contracture in preparation for progression to AAROM. Modality rationale: soreness to improve the patients ability to improve ADL ease.    Min Type Additional Details    [] Estim:  []Unatt       []IFC  []Premod                        []Other:  []w/ice   []w/heat  Position:  Location:    [] Estim: []Att    []TENS instruct  []NMES                    []Other:  []w/US   []w/ice   []w/heat  Position:  Location:    []  Traction: [] Cervical       []Lumbar                       [] Prone          []Supine                       []Intermittent   []Continuous Lbs:  [] before manual  [] after manual    []  Ultrasound: []Continuous   [] Pulsed                           []1MHz   []3MHz Location:  W/cm2:    []  Iontophoresis with dexamethasone         Location: [] Take home patch   [] In clinic    []  Ice     []  heat  []  Ice massage  []  Laser   []  Anodyne Position:  Location:    []  Laser with stim  []  Other: Position:  Location:   10 [x]  Vasopneumatic Device Pressure:       [] lo [x] med [] hi   Temperature: [x] lo [] med [] hi   [] Skin assessment post-treatment:  []intact []redness- no adverse reaction []redness  adverse reaction:           With   [] TE   [] TA   [] neuro   [] other: Patient Education: [x] Review HEP    [] Progressed/Changed HEP based on:   [] positioning   [] body mechanics   [] transfers   [] heat/ice application    [] other:      Other Objective/Functional Measures: near WNL PROM, limited assessment to protect repair integrity. Pain Level (0-10 scale) post treatment: 0    ASSESSMENT/Changes in Function: Pt continues to demonstrate near WNL shoulder PROM, though limited assessment of ROM towards end range rotation, abd, and flexion to avoid stressing repair. Pt will continue HEP and will progress to AAROM on 4/13/2018 pending clearance to progress on his orthopaedic appt on 4/12/2018. Patient will continue to benefit from skilled PT services to modify and progress therapeutic interventions, address functional mobility deficits, address ROM deficits, address strength deficits, analyze and address soft tissue restrictions, analyze and cue movement patterns, analyze and modify body mechanics/ergonomics and instruct in home and community integration to attain remaining goals. []  See Plan of Care  []  See progress note/recertification  []  See Discharge Summary         Progress towards goals / Updated goals:  Short Term Goals: To be accomplished in 1 weeks:                        1. Pt will be I and compliant with HEP Pt reports compliance 3/22/2018  Long Term Goals: To be accomplished in 4 weeks:                        1. Improve PROM right shoulder flexion to 140 degrees for improved ability for future tasks Near met, held further ROM to protect repair integrity.                       4. Improve PROM right shoulder PROM IR to 45 degrees for improved ability for future tasks Met 60 3/22/2018                        3. Improve FOTO to predicted outcome for improved ability for daily tasks.                        4. Improve PROM ER to 30 degrees for improved ability for daily tasks.  Met 3/22/2018    PLAN  []  Upgrade activities as tolerated     [x]  Continue plan of care  []  Update interventions per flow sheet       []  Discharge due to:_  [x]  Other:_AARJENISE NV if cleared to progress on 4/12/2018      Attila Yessica 4/2/2018  11:50 AM    Future Appointments  Date Time Provider Cristhian Knight   4/2/2018 12:00 PM Artie Rasheed [facility]   4/11/2018 10:45 AM Ottoniel Kraus MD [facility]   4/12/2018 9:30 AM CAYLA Pardo    4/13/2018 12:00 PM Nato Nava, PT [facility]   12/11/2018 9:30 AM [facility]   12/18/2018 1:45 PM Chelsi Hills MD [address]

## 2018-04-04 ENCOUNTER — APPOINTMENT (OUTPATIENT)
Dept: PHYSICAL THERAPY | Age: 63
End: 2018-04-04
Payer: MEDICARE

## 2018-04-06 ENCOUNTER — APPOINTMENT (OUTPATIENT)
Dept: PHYSICAL THERAPY | Age: 63
End: 2018-04-06
Payer: MEDICARE

## 2018-04-09 ENCOUNTER — APPOINTMENT (OUTPATIENT)
Dept: PHYSICAL THERAPY | Age: 63
End: 2018-04-09
Payer: MEDICARE

## 2018-04-11 ENCOUNTER — APPOINTMENT (OUTPATIENT)
Dept: PHYSICAL THERAPY | Age: 63
End: 2018-04-11
Payer: MEDICARE

## 2018-04-12 ENCOUNTER — TELEPHONE (OUTPATIENT)
Dept: ORTHOPEDIC SURGERY | Age: 63
End: 2018-04-12

## 2018-04-12 ENCOUNTER — OFFICE VISIT (OUTPATIENT)
Dept: ORTHOPEDIC SURGERY | Age: 63
End: 2018-04-12

## 2018-04-12 ENCOUNTER — DOCUMENTATION ONLY (OUTPATIENT)
Dept: ORTHOPEDIC SURGERY | Age: 63
End: 2018-04-12

## 2018-04-12 VITALS — WEIGHT: 230 LBS | HEIGHT: 73 IN | BODY MASS INDEX: 30.48 KG/M2

## 2018-04-12 DIAGNOSIS — M75.121 COMPLETE TEAR OF RIGHT ROTATOR CUFF: Primary | ICD-10-CM

## 2018-04-12 DIAGNOSIS — Z98.890 STATUS POST RIGHT ROTATOR CUFF REPAIR: ICD-10-CM

## 2018-04-12 NOTE — TELEPHONE ENCOUNTER
Patient called in requesting to speak with Nevaeh Warren in regards to his [place] papers. Please contact patient at [phone].

## 2018-04-12 NOTE — PROGRESS NOTES
Disability paperwork filled out and copied.   Patient was provided the original and states that he will \"send it up\"

## 2018-04-12 NOTE — PROGRESS NOTES
Gely Pacheco  1955     HISTORY OF PRESENT ILLNESS  Gely Pacheco is a 70 y.o. male who presents today for evaluation s/p Right shoulder arthroscopic rotator cuff repair on 3/15/18. Patient has been going to PT, once per week. Describes pain as a 0/10. Patient reports a knot over the right shoulder/arm area. He actively moved the arm today to show me his ROM against my advice. He has stopped taking pain medication. Patient denies any fever, chills, chest pain, shortness of breath or calf pain. There are no new illness or injuries to report since last seen in the office. PHYSICAL EXAM:   Visit Vitals    Ht 6' 1\" (1.854 m)    Wt 230 lb (104.3 kg)    BMI 30.34 kg/m2      The patient is a well-developed, well-nourished male in no acute distress. The patient is alert and oriented times three. The patient appears to be well groomed.  Mood and affect are normal.  ORTHOPEDIC EXAM of right shoulder:  Inspection: swelling not present,  Bruising not present  Incisions well healed  Passive glenohumeral abduction 0-90 degrees  Stability: Stable  Strength: n/a  2+ distal pulses    IMPRESSION:  S/P Right shoulder arthroscopic rotator cuff repair    PLAN:   Patient is doing well postoperatively  D/c use of the sling in 1 week  Continue with PT: AAROM x 1 week then AROM  Emphasized to the patient: no lifting, nothing that causes an increase in pain  Patient does not need pain medication    RTC 4 weeks    Scribed by Nano Dong ([facility]) as dictated by HEATHER Vasques [address] and Spine Specialist

## 2018-04-13 ENCOUNTER — HOSPITAL ENCOUNTER (OUTPATIENT)
Dept: PHYSICAL THERAPY | Age: 63
Discharge: HOME | End: 2018-04-13
Payer: MEDICARE

## 2018-04-13 PROCEDURE — G8985 CARRY GOAL STATUS: HCPCS

## 2018-04-13 PROCEDURE — 97110 THERAPEUTIC EXERCISES: CPT

## 2018-04-13 PROCEDURE — G8984 CARRY CURRENT STATUS: HCPCS

## 2018-04-13 PROCEDURE — 97016 VASOPNEUMATIC DEVICE THERAPY: CPT

## 2018-04-13 NOTE — PROGRESS NOTES
[facility]  [address]  [place], [address]  [phone] fax    Medicare Progress Report    Patient name: David Colón Start of Care: 3/16/2018   Referral source: Amirah Apple,* : 1955                         Medical Diagnosis: Impingement syndrome of right shoulder [M75.41] Onset TEMM:                         Treatment Diagnosis: s/p right RTC repair, acromioplasty   Prior Hospitalization: see medical history Provider#: [id number]   Medications: Verified on Patient summary List    Comorbidities: DM, L shoulder surgery, OA   Prior Level of Function: functionally I with all activities     Visits from Start of Care: 6    Missed Visits: -    Reporting Period: 3-16-18 to 18    Subjective Reports: pt reports no c/o pain. Progress towards goals:  Short Term Goals: To be accomplished in 1 weeks:                        1. Pt will be I and compliant with HEP Pt reports compliance 3/22/2018  Long Term Goals: To be accomplished in 4 weeks:                        3. Improve PROM right shoulder flexion to 140 degrees for improved ability for future tasks Near met, held further ROM to protect repair integrity.                       3. Improve PROM right shoulder PROM IR to 45 degrees for improved ability for future tasks Met 60 3/22/2018                        3. Improve FOTO to predicted outcome for improved ability for daily tasks. - MET 53 on 18                        4. Improve PROM ER to 30 degrees for improved ability for daily tasks. Met 3/22/2018    Key functional changes: pt has progressed well with PT. Problems/ barriers to goal attainment: none     Assessment / Recommendations:pt progressing well with PT and is progressing well.      Problem List: decrease ROM, decrease strength, decrease ADL/ functional abilitiies, decrease activity tolerance and decrease flexibility/ joint mobility   Treatment Plan: Therapeutic exercise, Therapeutic activities, Neuromuscular re-education, Physical agent/modality, Manual therapy, Patient education and Self Care training    Patient Goal (s) has been updated and includes: to start next phase     Updated Goals to be accomplished in 4 weeks:  1. Pt will progress to AROM phase without pain increased for improved functional use of UE  2. Pt will report being able to reach a shelf overhead without difficulty  3. Pt will demonstrate 130 degrees of AROM right shoulder flexion for improved ability for functional tasks  4. Improve FOTO score to 60 to improve ability for daily tasks    Frequency / Duration: Patient to be seen 2 times per week for 4 weeks:    G-Codes (GP)    Carry   Current  CK= 40-59%    Goal  CK= 40-59%      The severity rating is based on clinical judgment and the FOTO score.       Jenifer Interiano, PT 4/13/2018 12:36 PM

## 2018-04-13 NOTE — TELEPHONE ENCOUNTER
Spoke with Mr. Anusha Donahue, he needs additional information on his [place] paperwork for disability. He did drop the form off again to have additional information added.

## 2018-04-13 NOTE — PROGRESS NOTES
PT DAILY TREATMENT NOTE - [facility]     Patient Name: Wilner Rushing  Date:2018  : 1955  [x]  Patient  Verified  Payor: VA MEDICARE / Plan: VA MEDICARE PART A & B / Product Type: Medicare /    In time:12:00  Out time: 1233  Total Treatment Time (min): 33  Total Timed Codes (min): 23  1:1 Treatment Time [facility] only):33    Visit #:  6 of 12    Treatment Area: Right shoulder pain [M25.511]    SUBJECTIVE  Pain Level (0-10 scale): 0  Any medication changes, allergies to medications, adverse drug reactions, diagnosis change, or new procedure performed?: [x] No    [] Yes (see summary sheet for update)  Subjective functional status/changes:   [] No changes reported  Pt reports he is doing fine. OBJECTIVE    23 min Therapeutic Exercise:  [x] See flow sheet :   Rationale: increase ROM to improve the patients ability to reduce risk of contracture in preparation for progression to AAROM. Modality rationale: soreness to improve the patients ability to improve ADL ease.    Min Type Additional Details    [] Estim:  []Unatt       []IFC  []Premod                        []Other:  []w/ice   []w/heat  Position:  Location:    [] Estim: []Att    []TENS instruct  []NMES                    []Other:  []w/US   []w/ice   []w/heat  Position:  Location:    []  Traction: [] Cervical       []Lumbar                       [] Prone          []Supine                       []Intermittent   []Continuous Lbs:  [] before manual  [] after manual    []  Ultrasound: []Continuous   [] Pulsed                           []1MHz   []3MHz Location:  W/cm2:    []  Iontophoresis with dexamethasone         Location: [] Take home patch   [] In clinic    []  Ice     []  heat  []  Ice massage  []  Laser   []  Anodyne Position:  Location:    []  Laser with stim  []  Other: Position:  Location:   10 [x]  Vasopneumatic Device Pressure:       [] lo [x] med [] hi   Temperature: [x] lo [] med [] hi   [] Skin assessment post-treatment:  []intact []redness- no adverse reaction    []redness  adverse reaction:           With   [] TE   [] TA   [] neuro   [] other: Patient Education: [x] Review HEP    [] Progressed/Changed HEP based on:   [] positioning   [] body mechanics   [] transfers   [] heat/ice application    [] other:      Other Objective/Functional Measures: progressed to AAROM. FOTO\" 53     Pain Level (0-10 scale) post treatment: 0    ASSESSMENT/Changes in Function: Pt tolerated AAROM without difficulty. Progressing well. Re-educated in surgical precautions. Patient will continue to benefit from skilled PT services to modify and progress therapeutic interventions, address functional mobility deficits, address ROM deficits, address strength deficits, analyze and address soft tissue restrictions, analyze and cue movement patterns, analyze and modify body mechanics/ergonomics and instruct in home and community integration to attain remaining goals. []  See Plan of Care  [x]  See progress note/recertification  []  See Discharge Summary         Progress towards goals / Updated goals:  Short Term Goals: To be accomplished in 1 weeks:                        1. Pt will be I and compliant with HEP Pt reports compliance 3/22/2018  Long Term Goals: To be accomplished in 4 weeks:                        5. Improve PROM right shoulder flexion to 140 degrees for improved ability for future tasks Near met, held further ROM to protect repair integrity.                       8. Improve PROM right shoulder PROM IR to 45 degrees for improved ability for future tasks Met 60 3/22/2018                        3. Improve FOTO to predicted outcome for improved ability for daily tasks. - MET 53 on 4-13-18                        4. Improve PROM ER to 30 degrees for improved ability for daily tasks.  Met 3/22/2018    PLAN  []  Upgrade activities as tolerated     [x]  Continue plan of care  []  Update interventions per flow sheet       []  Discharge due to:_  [x]  Other:_AAROM NV if cleared to progress on 4/12/2018      Marilyn Modi, PT 4/13/2018  12:32 PM    Future Appointments  Date Time Provider Cristhian Knight   5/10/2018 9:45 AM CAYLA Delgadillo   12/11/2018 9:30 AM [facility] NURSE [facility] EUGENIE SESAY   12/18/2018 1:45 PM MD Carlos Alberto Aragon   4/10/2019 10:45 AM MD Carlos Alberto Selby

## 2018-04-16 ENCOUNTER — DOCUMENTATION ONLY (OUTPATIENT)
Dept: ORTHOPEDIC SURGERY | Age: 63
End: 2018-04-16

## 2018-04-16 NOTE — PROGRESS NOTES
Patient dropped off a second disability form at [facility] office 4/17 for completion requesting that we include the Acromioplasty of right shoulder for VA claim. Placed in forms bin today.

## 2018-04-23 ENCOUNTER — APPOINTMENT (OUTPATIENT)
Dept: PHYSICAL THERAPY | Age: 63
End: 2018-04-23
Payer: MEDICARE

## 2018-04-26 ENCOUNTER — HOSPITAL ENCOUNTER (OUTPATIENT)
Dept: PHYSICAL THERAPY | Age: 63
Discharge: HOME | End: 2018-04-26
Payer: MEDICARE

## 2018-04-26 PROCEDURE — 97016 VASOPNEUMATIC DEVICE THERAPY: CPT

## 2018-04-26 PROCEDURE — 97110 THERAPEUTIC EXERCISES: CPT

## 2018-04-26 NOTE — PROGRESS NOTES
PT DAILY TREATMENT NOTE - [facility]     Patient Name: Real Stafford  Date:2018  : 1955  [x]  Patient  Verified  Payor: VA MEDICARE / Plan: VA MEDICARE PART A & B / Product Type: Medicare /    In time:9:00  Out time: 9:38  Total Treatment Time (min): 38  Total Timed Codes (min): 28  1:1 Treatment Time [facility] only):28    Visit #:  1 of 8    Treatment Area: Right shoulder pain [M25.511]    SUBJECTIVE  Pain Level (0-10 scale): 0  Any medication changes, allergies to medications, adverse drug reactions, diagnosis change, or new procedure performed?: [x] No    [] Yes (see summary sheet for update)  Subjective functional status/changes:   [] No changes reported  Pt reports he has been doing his pulleys at home. OBJECTIVE    28 min Therapeutic Exercise:  [x] See flow sheet :   Rationale: increase ROM to improve the patients ability to reduce risk of contracture in preparation for progression to AAROM. Modality rationale: soreness to improve the patients ability to improve ADL ease.    Min Type Additional Details    [] Estim:  []Unatt       []IFC  []Premod                        []Other:  []w/ice   []w/heat  Position:  Location:    [] Estim: []Att    []TENS instruct  []NMES                    []Other:  []w/US   []w/ice   []w/heat  Position:  Location:    []  Traction: [] Cervical       []Lumbar                       [] Prone          []Supine                       []Intermittent   []Continuous Lbs:  [] before manual  [] after manual    []  Ultrasound: []Continuous   [] Pulsed                           []1MHz   []3MHz Location:  W/cm2:    []  Iontophoresis with dexamethasone         Location: [] Take home patch   [] In clinic    []  Ice     []  heat  []  Ice massage  []  Laser   []  Anodyne Position:  Location:    []  Laser with stim  []  Other: Position:  Location:   10 [x]  Vasopneumatic Device Pressure:       [] lo [x] med [] hi   Temperature: [x] lo [] med [] hi   [] Skin assessment post-treatment: []intact []redness- no adverse reaction    []redness  adverse reaction:           With   [] TE   [] TA   [] neuro   [] other: Patient Education: [x] Review HEP    [] Progressed/Changed HEP based on:   [] positioning   [] body mechanics   [] transfers   [] heat/ice application    [] other:      Other Objective/Functional Measures: added finger ladder    Pain Level (0-10 scale) post treatment: 0    ASSESSMENT/Changes in Function:  Pt continues to progress well with PT. He tolerated finger ladder well. Patient will continue to benefit from skilled PT services to modify and progress therapeutic interventions, address functional mobility deficits, address ROM deficits, address strength deficits, analyze and address soft tissue restrictions, analyze and cue movement patterns, analyze and modify body mechanics/ergonomics and instruct in home and community integration to attain remaining goals. []  See Plan of Care  []  See progress note/recertification  []  See Discharge Summary         Progress towards goals / Updated goals:  Updated Goals to be accomplished in 4 weeks:  1. Pt will progress to AROM phase without pain increased for improved functional use of UE  2. Pt will report being able to reach a shelf overhead without difficulty  3. Pt will demonstrate 130 degrees of AROM right shoulder flexion for improved ability for functional tasks  4.  Improve FOTO score to 60 to improve ability for daily tasks    PLAN  []  Upgrade activities as tolerated     [x]  Continue plan of care  []  Update interventions per flow sheet       []  Discharge due to:_  [x]  Other:_MAYRA BOWEN if cleared to progress on 4/12/2018      Deanna Rivera PT 4/26/2018  9:32 AM    Future Appointments  Date Time Provider Cristhian Knight   4/26/2018 9:00 AM Deanna Rivera PT [facility]   4/27/2018 [facility]30 [facility]   4/30/2018 1:30 PM [facility]   5/3/2018 9:30 AM [facility]   5/7/2018 9:00 AM [address] [facility]   5/9/2018 9:30 AM Verluis f Saleem, PT [facility]   5/10/2018 9:45 AM CAYLA [address]   5/14/2018 1:00 PM Verluis f Saleem, PT [facility]   5/16/2018 9:30 AM Verluis f Saleem, PT [facility]   12/11/2018 9:30 AM [facility]   12/18/2018 1:45 PM [facility]   4/10/2019 10:45 AM Cheryl Miranda MD [address]

## 2018-04-27 ENCOUNTER — HOSPITAL ENCOUNTER (OUTPATIENT)
Dept: PHYSICAL THERAPY | Age: 63
Discharge: HOME | End: 2018-04-27
Payer: MEDICARE

## 2018-04-27 PROCEDURE — 97016 VASOPNEUMATIC DEVICE THERAPY: CPT

## 2018-04-27 PROCEDURE — 97110 THERAPEUTIC EXERCISES: CPT

## 2018-04-27 NOTE — PROGRESS NOTES
PT DAILY TREATMENT NOTE - [facility]     Patient Name: Real Meo  Date:2018  : 1955  [x]  Patient  Verified  Payor: [place] MEDICARE / Plan: [place] MEDICARE PART A & B / Product Type: Medicare /    In time:9:30am  Out time:10:14am  Total Treatment Time (min): 44  Total Timed Codes (min): 34  1:1 Treatment Time ( only): 30  Visit #: 2 of 8    Treatment Area: Right shoulder pain [M25.511]    SUBJECTIVE  Pain Level (0-10 scale): 0  Any medication changes, allergies to medications, adverse drug reactions, diagnosis change, or new procedure performed?: [x] No    [] Yes (see summary sheet for update)  Subjective functional status/changes:   [x] No changes reported    OBJECTIVE  34 min Therapeutic Exercise:  [x] See flow sheet :   Rationale: increase ROM and increase strength to improve the patients ability to improve ease of OH reach and ADLs. Modality rationale: decrease pain to improve the patients ability to improve ADL ease.    Min Type Additional Details    [] Estim:  []Unatt       []IFC  []Premod                        []Other:  []w/ice   []w/heat  Position:  Location:    [] Estim: []Att    []TENS instruct  []NMES                    []Other:  []w/US   []w/ice   []w/heat  Position:  Location:    []  Traction: [] Cervical       []Lumbar                       [] Prone          []Supine                       []Intermittent   []Continuous Lbs:  [] before manual  [] after manual    []  Ultrasound: []Continuous   [] Pulsed                           []1MHz   []3MHz Location:  W/cm2:    []  Iontophoresis with dexamethasone         Location: [] Take home patch   [] In clinic    []  Ice     []  heat  []  Ice massage  []  Laser   []  Anodyne Position:  Location:    []  Laser with stim  []  Other: Position:  Location:   10 [x]  Vasopneumatic Device Pressure:       [x] lo [] med [] hi   Temperature: [x] lo [] med [] hi   [] Skin assessment post-treatment:  []intact []redness- no adverse reaction    []redness  adverse reaction:             With   [x] TE   [] TA   [] neuro   [] other: Patient Education: [x] Review HEP    [] Progressed/Changed HEP based on:   [] positioning   [] body mechanics   [] transfers   [] heat/ice application    [x] other: verbally instructed in pulleys in flex & scaption & standing cane AAROM flexion and scaption 3x 10 reps daily. Pt verbalized understanding and reports he is familiar as he has done them before for his other shoulder. Other Objective/Functional Measures: grossly demonstrate WNL shoulder PROM during interventions, held manual    Progressing well with AAROM, minimal occasional shoulder hike that corrects with cueing     Pain Level (0-10 scale) post treatment: 0    ASSESSMENT/Changes in Function: Pt progressing well into AAROM void of complications. Will plan to progress to AROM next week as tolerated. Patient will continue to benefit from skilled PT services to modify and progress therapeutic interventions, address functional mobility deficits, address ROM deficits, address strength deficits, analyze and address soft tissue restrictions, analyze and cue movement patterns, analyze and modify body mechanics/ergonomics and instruct in home and community integration to attain remaining goals. []  See Plan of Care  []  See progress note/recertification  []  See Discharge Summary         Progress towards goals / Updated goals:  Updated Goals to be accomplished in 4 weeks:  1. Pt will progress to AROM phase without pain increased for improved functional use of UE  2. Pt will report being able to reach a shelf overhead without difficulty  3. Pt will demonstrate 130 degrees of AROM right shoulder flexion for improved ability for functional tasks  4.  Improve FOTO score to 60 to improve ability for daily tasks    PLAN  []  Upgrade activities as tolerated     [x]  Continue plan of care  []  Update interventions per flow sheet       []  Discharge due to:_  [x]  Other:_  AROM ANDRÉS Osei, PT, DPT, ATC 4/27/2018  9:57 AM    Future Appointments  Date Time Provider Department Center   4/30/2018 1:30 PM Paula Bennett, PT [facility]   5/3/2018 9:30 AM Paula Bennett, PT [facility]   5/7/2018 9:00 AM [address] [facility]   5/9/2018 9:30 AM Paula Bennett, PT [facility]   5/10/2018 9:45 AM CAYLA Pope [facility]   5/14/2018 1:00 PM Paula Bennett PT [facility]   5/16/2018 9:30 AM Paula Bennett, PT [facility]   12/11/2018 9:30 AM [facility] NURSE [facility] SCHED   12/18/2018 1:45 PM MD Carlos Alberto Saez   4/10/2019 10:45 AM MD Carlos Alberto Wall

## 2018-04-30 ENCOUNTER — HOSPITAL ENCOUNTER (OUTPATIENT)
Dept: PHYSICAL THERAPY | Age: 63
Discharge: HOME | End: 2018-04-30
Payer: MEDICARE

## 2018-04-30 PROCEDURE — 97110 THERAPEUTIC EXERCISES: CPT

## 2018-04-30 PROCEDURE — 97016 VASOPNEUMATIC DEVICE THERAPY: CPT

## 2018-04-30 NOTE — PROGRESS NOTES
PT DAILY TREATMENT NOTE - [facility]     Patient Name: David Colón  Date:2018  : 1955  [x]  Patient  Verified  Payor: VA MEDICARE / Plan: VA MEDICARE PART A & B / Product Type: Medicare /    In time:3:00  Out time:3:41  Total Treatment Time (min): 41  Total Timed Codes (min): 31  1:1 Treatment Time ( only): 31   Visit #: 3 of 8    Treatment Area: Right shoulder pain [M25.511]    SUBJECTIVE  Pain Level (0-10 scale): 0/10  Any medication changes, allergies to medications, adverse drug reactions, diagnosis change, or new procedure performed?: [x] No    [] Yes (see summary sheet for update)  Subjective functional status/changes:   [] No changes reported  The patient reports compliance with HEP. OBJECTIVE  Modality rationale: decrease edema, decrease inflammation and decrease pain to improve the patients ability to improve ADL ease. Min Type Additional Details   10 [x]  Vasopneumatic Device Pressure:       [] lo [x] med [] hi   Temperature: [x] lo [] med [] hi   [] Skin assessment post-treatment:  []intact []redness- no adverse reaction    []redness  adverse reaction:     31 min Therapeutic Exercise:  [x] See flow sheet :   Rationale: increase ROM and increase strength to improve the patients ability to improve ADL ease. With   [] TE   [] TA   [] neuro   [] other: Patient Education: [x] Review HEP    [] Progressed/Changed HEP based on:   [] positioning   [] body mechanics   [] transfers   [] heat/ice application    [] other:      Other Objective/Functional Measures: The patient displays AAROM exercises with excellent ROM void of appreciable compensations. ,  Full PROM noted upon assessment. Pain Level (0-10 scale) post treatment: 0/10    ASSESSMENT/Changes in Function: Continue progression towards AROM. No compensations or pain reported during exercise.     Patient will continue to benefit from skilled PT services to modify and progress therapeutic interventions, address functional mobility deficits, address ROM deficits, address strength deficits, analyze and address soft tissue restrictions, analyze and cue movement patterns, analyze and modify body mechanics/ergonomics, assess and modify postural abnormalities and instruct in home and community integration to attain remaining goals. []  See Plan of Care  []  See progress note/recertification  []  See Discharge Summary         Progress towards goals / Updated goals:  Updated Goals to be accomplished in 4 weeks:  1. Pt will progress to AROM phase without pain increased for improved functional use of UE  2. Pt will report being able to reach a shelf overhead without difficulty  3. Pt will demonstrate 130 degrees of AROM right shoulder flexion for improved ability for functional tasks  4.  Improve FOTO score to 60 to improve ability for daily tasks    PLAN  []  Upgrade activities as tolerated     [x]  Continue plan of care  []  Update interventions per flow sheet       []  Discharge due to:_  []  Other:_      Suzanne Davis, PT 4/30/2018  3:11 PM    Future Appointments  Date Time Provider Cristhian Michellei   5/3/2018 9:30 AM Angelina Blair, PT [facility]   5/7/2018 9:00 AM [address] [place] [address]   5/9/2018 9:30 AM Angelina Blair, PT [facility]   5/10/2018 9:45 AM CAYLA L[address]   5/14/2018 1:00 PM Angelina Blair, PT [facility]   5/16/2018 9:30 AM Angelina Blair, PT [facility]   12/11/2018 9:30 AM [facility] SCHED   12/18/2018 1:45 PM MD Carlos Alberto Zhu   4/10/2019 10:45 AM MD Carlos Alberto Oliver

## 2018-05-03 ENCOUNTER — HOSPITAL ENCOUNTER (OUTPATIENT)
Dept: PHYSICAL THERAPY | Age: 63
Discharge: HOME | End: 2018-05-03
Payer: MEDICARE

## 2018-05-03 PROCEDURE — 97110 THERAPEUTIC EXERCISES: CPT

## 2018-05-03 PROCEDURE — 97016 VASOPNEUMATIC DEVICE THERAPY: CPT

## 2018-05-03 NOTE — PROGRESS NOTES
PT DAILY TREATMENT NOTE - [facility]     Patient Name: Gilbert Cuevas  Date:5/3/2018  : 1955  [x]  Patient  Verified  Payor: VA MEDICARE / Plan: VA MEDICARE PART A & B / Product Type: Medicare /    In WQEO:[id number] Out time:1010  Total Treatment Time (min): 38  Total Timed Codes (min): 28  1:1 Treatment Time ([facility] only): 28  Visit #: 4 of 8    Treatment Area: Right shoulder pain [M25.511]    SUBJECTIVE  Pain Level (0-10 scale): 0/10  Any medication changes, allergies to medications, adverse drug reactions, diagnosis change, or new procedure performed?: [x] No    [] Yes (see summary sheet for update)  Subjective functional status/changes:   [] No changes reported  Pt reports he is feeling stiff today. He reports he thinks he did a little too much at home. (PT reviewed precautions at this phase)    OBJECTIVE  Modality rationale: decrease edema, decrease inflammation and decrease pain to improve the patients ability to improve ADL ease. Min Type Additional Details   10 [x]  Vasopneumatic Device Pressure:       [] lo [x] med [] hi   Temperature: [x] lo [] med [] hi   [] Skin assessment post-treatment:  []intact []redness- no adverse reaction    []redness  adverse reaction:     28 min Therapeutic Exercise:  [x] See flow sheet :   Rationale: increase ROM and increase strength to improve the patients ability to improve ADL ease. With   [] TE   [] TA   [] neuro   [] other: Patient Education: [x] Review HEP    [] Progressed/Changed HEP based on:   [] positioning   [] body mechanics   [] transfers   [] heat/ice application    [] other:      Other Objective/Functional Measures: added AROM flexion     Pain Level (0-10 scale) post treatment: 0/10    ASSESSMENT/Changes in Function: pt continues to progress well with PT. He demonstrates good tolerance with AROM flexion. He has been reminded of surgical precautions.     Patient will continue to benefit from skilled PT services to modify and progress therapeutic interventions, address functional mobility deficits, address ROM deficits, address strength deficits, analyze and address soft tissue restrictions, analyze and cue movement patterns, analyze and modify body mechanics/ergonomics, assess and modify postural abnormalities and instruct in home and community integration to attain remaining goals. []  See Plan of Care  []  See progress note/recertification  []  See Discharge Summary         Progress towards goals / Updated goals:  Updated Goals to be accomplished in 4 weeks:  1. Pt will progress to AROM phase without pain increased for improved functional use of UE  2. Pt will report being able to reach a shelf overhead without difficulty  3. Pt will demonstrate 130 degrees of AROM right shoulder flexion for improved ability for functional tasks  4.  Improve FOTO score to 60 to improve ability for daily tasks    PLAN  []  Upgrade activities as tolerated     [x]  Continue plan of care  []  Update interventions per flow sheet       []  Discharge due to:_  []  Other:_      Josh Rincon PT 5/3/2018  10:51 AM    Future Appointments  Date Time Provider Cristhian Michellei   5/7/2018 9:00 AM [address] [place] [address]   5/9/2018 9:30 AM Johs Rincon PT [facility]   5/10/2018 9:45 AM CAYLA [address]   5/14/2018 1:00 PM [facility]   5/16/2018 9:30 AM [facility]   12/11/2018 9:30 AM Abby Valentin [facility] NURSE [facility] EUGENIE SCHED   12/18/2018 1:45 PM MD Carlos Alberto Adam   4/10/2019 10:45 AM Logan Bence, MD [place]

## 2018-05-07 ENCOUNTER — HOSPITAL ENCOUNTER (OUTPATIENT)
Dept: PHYSICAL THERAPY | Age: 63
Discharge: HOME | End: 2018-05-07
Payer: MEDICARE

## 2018-05-07 PROCEDURE — 97110 THERAPEUTIC EXERCISES: CPT

## 2018-05-07 PROCEDURE — 97016 VASOPNEUMATIC DEVICE THERAPY: CPT

## 2018-05-07 NOTE — PROGRESS NOTES
PT DAILY TREATMENT NOTE - [facility]     Patient Name: Cornelia Barboza  Date:2018  : 1955  [x]  Patient  Verified  Payor: VA MEDICARE / Plan: VA MEDICARE PART A & B / Product Type: Medicare /    In time:9:00am  Out time:9:48am  Total Treatment Time (min): 48  Total Timed Codes (min): 38  1:1 Treatment Time ( [address] only): 28   Visit #: 5 of 8    Treatment Area: Right shoulder pain [M25.511]    SUBJECTIVE  Pain Level (0-10 scale): 0  Any medication changes, allergies to medications, adverse drug reactions, diagnosis change, or new procedure performed?: [x] No    [] Yes (see summary sheet for update)  Subjective functional status/changes:   [] No changes reported  \"I got out in the garden yesterday and it felt pretty good. \"    OBJECTIVE  30 min Therapeutic Exercise:  [x] See flow sheet :   Rationale: increase ROM and increase strength to improve the patients ability to improve ease of ADLs. 8 min Neuromuscular Re-education:  [x]  See flow sheet :   Rationale: increase strength, improve coordination and increase proprioception  to improve the patients ability to improve scapulohumeral rhythm and ease of OH reach. Modality rationale: decrease pain to improve the patients ability to improve ADL ease.    Min Type Additional Details    [] Estim:  []Unatt       []IFC  []Premod                        []Other:  []w/ice   []w/heat  Position:  Location:    [] Estim: []Att    []TENS instruct  []NMES                    []Other:  []w/US   []w/ice   []w/heat  Position:  Location:    []  Traction: [] Cervical       []Lumbar                       [] Prone          []Supine                       []Intermittent   []Continuous Lbs:  [] before manual  [] after manual    []  Ultrasound: []Continuous   [] Pulsed                           []1MHz   []3MHz Location:  W/cm2:    []  Iontophoresis with dexamethasone         Location: [] Take home patch   [] In clinic    []  Ice     []  heat  []  Ice massage  []  Laser   [] Anodyne Position:  Location:    []  Laser with stim  []  Other: Position:  Location:   10 [x]  Vasopneumatic Device Pressure:       [] lo [x] med [] hi   Temperature: [x] lo [] med [] hi   [] Skin assessment post-treatment:  []intact []redness- no adverse reaction    []redness  adverse reaction:           With   [] TE   [] TA   [] neuro   [] other: Patient Education: [x] Review HEP    [] Progressed/Changed HEP based on:   [] positioning   [] body mechanics   [] transfers   [] heat/ice application    [] other:      Other Objective/Functional Measures: demonstrates near full AAROM against gravity, good AROM shoulder elevation without significant evidence of scapular compensatory motion     Pain Level (0-10 scale) post treatment: 0    ASSESSMENT/Changes in Function: Pt demonstrates near full AAROM/AROM, with good scapulohumeral rhythm. He does report onset of \"tightness\" and fatigue with repeated AROM. Continue progressing AROM per POC. Patient will continue to benefit from skilled PT services to modify and progress therapeutic interventions, address functional mobility deficits, address ROM deficits, address strength deficits, analyze and address soft tissue restrictions, analyze and cue movement patterns and instruct in home and community integration to attain remaining goals. []  See Plan of Care  []  See progress note/recertification  []  See Discharge Summary         Progress towards goals / Updated goals:  Updated Goals to be accomplished in 4 weeks:  1. Pt will progress to AROM phase without pain increased for improved functional use of UE  2. Pt will report being able to reach a shelf overhead without difficulty  3. Pt will demonstrate 130 degrees of AROM right shoulder flexion for improved ability for functional tasks Grossly met, but not formally assessed 5/7/2018  4.  Improve FOTO score to 60 to improve ability for daily tasks    PLAN  []  Upgrade activities as tolerated     [x]  Continue plan of care  []  Update interventions per flow sheet       []  Discharge due to:_  []  Other:_      Patricia Frances 5/7/2018  9:28 AM    Future Appointments  Date Time Provider Cristhian Knight   5/9/2018 9:30 AM Elida Dutton, PT [facility]   5/10/2018 9:45 AM CAYLA [address]   5/14/2018 1:00 PM Elida Dutton, PT [facility]   5/16/2018 9:30 AM Elida Dutton, PT [facility]   12/11/2018 9:30 AM [facility] NURSE [facility] EUGENIE SCHED   12/18/2018 1:45 PM MD Carlos Alberto Macias   4/10/2019 10:45 AM MD Carlos Alberto Parson

## 2018-05-09 ENCOUNTER — HOSPITAL ENCOUNTER (OUTPATIENT)
Dept: PHYSICAL THERAPY | Age: 63
Discharge: HOME | End: 2018-05-09
Payer: MEDICARE

## 2018-05-09 PROCEDURE — 97016 VASOPNEUMATIC DEVICE THERAPY: CPT

## 2018-05-09 PROCEDURE — 97110 THERAPEUTIC EXERCISES: CPT

## 2018-05-09 NOTE — PROGRESS NOTES
PT DAILY TREATMENT NOTE - [facility]     Patient Name: Carlos A Martin  Date:2018  : 1955  [x]  Patient  Verified  Payor: VA MEDICARE / Plan: VA MEDICARE PART A & B / Product Type: Medicare /    In time:930 Out time:  Total Treatment Time (min): 56  Total Timed Codes (min): 46  1:1 Treatment Time ( [address] only): 41  Visit #: 6 of 8    Treatment Area: Right shoulder pain [M25.511]    SUBJECTIVE  Pain Level (0-10 scale): 0/10  Any medication changes, allergies to medications, adverse drug reactions, diagnosis change, or new procedure performed?: [x] No    [] Yes (see summary sheet for update)  Subjective functional status/changes:   [] No changes reported  Pt reports no complaints at this time. OBJECTIVE  Modality rationale: decrease edema, decrease inflammation and decrease pain to improve the patients ability to improve ADL ease. Min Type Additional Details   10 [x]  Vasopneumatic Device Pressure:       [] lo [x] med [] hi   Temperature: [x] lo [] med [] hi   [] Skin assessment post-treatment:  []intact []redness- no adverse reaction    []redness  adverse reaction:     46 min Therapeutic Exercise:  [x] See flow sheet :   Rationale: increase ROM and increase strength to improve the patients ability to improve ADL ease. With   [] TE   [] TA   [] neuro   [] other: Patient Education: [x] Review HEP    [] Progressed/Changed HEP based on:   [] positioning   [] body mechanics   [] transfers   [] heat/ice application    [] other:      Other Objective/Functional Measures: UBE level 1, added 3# with bent over rows      Pain Level (0-10 scale) post treatment: 0/10    ASSESSMENT/Changes in Function: pt tolerated added therex well. Reminded pt of surgical precautions.      Patient will continue to benefit from skilled PT services to modify and progress therapeutic interventions, address functional mobility deficits, address ROM deficits, address strength deficits, analyze and address soft tissue restrictions, analyze and cue movement patterns, analyze and modify body mechanics/ergonomics, assess and modify postural abnormalities and instruct in home and community integration to attain remaining goals. []  See Plan of Care  []  See progress note/recertification  []  See Discharge Summary         Progress towards goals / Updated goals:  Updated Goals to be accomplished in 4 weeks:  1. Pt will progress to AROM phase without pain increased for improved functional use of UE - MET  2. Pt will report being able to reach a shelf overhead without difficulty  3. Pt will demonstrate 130 degrees of AROM right shoulder flexion for improved ability for functional tasks Grossly met, but not formally assessed 5/7/2018  4.  Improve FOTO score to 60 to improve ability for daily tasks       PLAN  []  Upgrade activities as tolerated     [x]  Continue plan of care  []  Update interventions per flow sheet       []  Discharge due to:_  []  Other:_      Zheng Fallon, PT 5/9/2018  10:51 AM    Future Appointments  Date Time Provider Cristhian Micehllei   5/10/2018 9:45 AM CAYLA [address]   5/14/2018 1:00 PM Zheng Fallon, PT [facility]   5/16/2018 9:30 [facility]   [facility]2018 [facility] SCH[facility]   12/18/2018 1:45 PM MD Carlos Alberto Pryor   4/10/2019 10:45 AM Stacia Hamman, MD [place]

## 2018-05-10 ENCOUNTER — OFFICE VISIT (OUTPATIENT)
Dept: ORTHOPEDIC SURGERY | Age: 63
End: 2018-05-10

## 2018-05-10 VITALS — HEIGHT: 73 IN | BODY MASS INDEX: 30.35 KG/M2 | WEIGHT: 229 LBS

## 2018-05-10 DIAGNOSIS — M75.121 COMPLETE TEAR OF RIGHT ROTATOR CUFF: Primary | ICD-10-CM

## 2018-05-10 NOTE — PROGRESS NOTES
Tani Lo  1955     HISTORY OF PRESENT ILLNESS  Tani Lo is a 70 y.o. male who presents today for evaluation s/p Right shoulder arthroscopic rotator cuff repair on 3/15/18. Patient has been going to PT, once per week. Describes pain as a 0/10. Patient states the arm feels great. Still feels some tightness but it is improving. Patient denies any fever, chills, chest pain, shortness of breath or calf pain. There are no new illness or injuries to report since last seen in the office. PHYSICAL EXAM:   Visit Vitals    Ht 6' 1\" (1.854 m)    Wt 229 lb (103.9 kg)    BMI 30.21 kg/m2      The patient is a well-developed, well-nourished male in no acute distress. The patient is alert and oriented times three. The patient appears to be well groomed. Mood and affect are normal.  ORTHOPEDIC EXAM of right shoulder:  Inspection: swelling not present,  Bruising not present  Incisions well healed  Passive glenohumeral abduction 0-90 degrees  Stability: Stable  Strength: 4/5  2+ distal pulses    IMPRESSION:  S/P Right shoulder arthroscopic rotator cuff repair    PLAN:   Patient is doing well postoperatively  Cont with PT working on gentle strengthening. May transition to HEP when ready  Stressed no lifting greater than 5 lbs x 3 weeks then can gradually increase to 10lbs.      RTC 6 weeks     HEATHER Escobar [facility]ist

## 2018-05-14 ENCOUNTER — HOSPITAL ENCOUNTER (OUTPATIENT)
Dept: PHYSICAL THERAPY | Age: 63
Discharge: HOME | End: 2018-05-14
Payer: MEDICARE

## 2018-05-14 PROCEDURE — 97016 VASOPNEUMATIC DEVICE THERAPY: CPT

## 2018-05-14 PROCEDURE — 97110 THERAPEUTIC EXERCISES: CPT

## 2018-05-14 PROCEDURE — G8984 CARRY CURRENT STATUS: HCPCS

## 2018-05-14 PROCEDURE — G8985 CARRY GOAL STATUS: HCPCS

## 2018-05-14 NOTE — PROGRESS NOTES
[facility]  [address]  [place], [address]  [phone] fax    Medicare Progress Report    Patient name: Wilner Rushing Start of Care: 3/16/2018   Referral source: Casa Keen,* : 1955                         Medical Diagnosis: Impingement syndrome of right shoulder [M75.41] Onset Date:3-15-18                         Treatment Diagnosis: s/p right RTC repair, acromioplasty   Prior Hospitalization: see medical history Provider#: [id number]   Medications: Verified on Patient summary List    Comorbidities: DM, L shoulder surgery, OA   Prior Level of Function: functionally I with all activities     Visits from Start of Care:13      Missed Visits: -    Reporting Period: 18 to 18    Subjective Reports: no complaints    Progress towards goals:  Updated Goals to be accomplished in 4 weeks:  1. Pt will progress to AROM phase without pain increased for improved functional use of UE - MET  2. Pt will report being able to reach a shelf overhead without difficulty- progressing 18  3. Pt will demonstrate 130 degrees of AROM right shoulder flexion for improved ability for functional tasks Grossly met, but not formally assessed 2018  4. Improve FOTO score to 60 to improve ability for daily tasks - progressing      Key functional changes: pt continues to progress well with PT> PROM is WNL, AROM is progressing. Problems/ barriers to goal attainment: none     Assessment / Recommendations:pt progressing well and is ready for progression per protocol. Problem List: decrease ROM, decrease strength, decrease ADL/ functional abilitiies and decrease activity tolerance   Treatment Plan: Therapeutic exercise, Therapeutic activities, Neuromuscular re-education, Physical agent/modality, Manual therapy, Patient education and Self Care training    Patient Goal (s) has been updated and includes:  To get back to being able to pull my bow Updated Goals to be accomplished in 4 weeks:  1. Improve FOTO score to 60 to improve ablity for daily tasks  2. Pt will demonstrate ability to reach an overhead shelf without difficulty. 3. Pt will demonstrate equal B shoulder AROM flexion to improve ability for daily tasks. Frequency / Duration: Patient to be seen 2 times per week for 2-4 weeks:    G-Codes (GP)  Carry   Current  CK= 40-59%    Goal  CK= 40-59%      The severity rating is based on clinical judgment and the FOTO score.       Atul Quinn, PT 5/14/2018 1:29 PM

## 2018-05-14 NOTE — PROGRESS NOTES
PT DAILY TREATMENT NOTE - [facility]     Patient Name: Ana Luisa Paris  Date:2018  : 1955  [x]  Patient  Verified  Payor: [place] MEDICARE / Plan: [place] MEDICARE PART A & B / Product Type: Medicare /    In time:1:00  Out time:146  Total Treatment Time (min): 46  Total Timed Codes (min): 36  1:1 Treatment Time ( [address] only): 36  Visit #: 7 of 8    Treatment Area: Right shoulder pain [M25.511]    SUBJECTIVE  Pain Level (0-10 scale): 0/10  Any medication changes, allergies to medications, adverse drug reactions, diagnosis change, or new procedure performed?: [x] No    [] Yes (see summary sheet for update)  Subjective functional status/changes:   [x] No changes reported   Pt states no changes at this time. OBJECTIVE    Modality rationale: decrease edema, decrease inflammation and decrease pain to improve the patients ability to improve ADL's.    Min Type Additional Details    [] Estim:  []Unatt       []IFC  []Premod                        []Other:  []w/ice   []w/heat  Position:  Location:    [] Estim: []Att    []TENS instruct  []NMES                    []Other:  []w/US   []w/ice   []w/heat  Position:  Location:    []  Traction: [] Cervical       []Lumbar                       [] Prone          []Supine                       []Intermittent   []Continuous Lbs:  [] before manual  [] after manual    []  Ultrasound: []Continuous   [] Pulsed                           []1MHz   []3MHz W/cm2:  Location:    []  Iontophoresis with dexamethasone         Location: [] Take home patch   [] In clinic    []  Ice     []  heat  []  Ice massage  []  Laser   []  Anodyne Position:  Location:    []  Laser with stim  []  Other:  Position:  Location:   10 [x]  Vasopneumatic Device Pressure:       [x] lo [] med [] hi   Temperature: [x] lo [] med [] hi   [] Skin assessment post-treatment:  []intact []redness- no adverse reaction    []redness  adverse reaction:     36 min Therapeutic Exercise:  [x] See flow sheet :   Rationale: increase ROM and increase strength to improve the patients ability to improve ADL's. With   [x] TE   [] TA   [] neuro   [] other: Patient Education: [x] Review HEP    [] Progressed/Changed HEP based on:   [] positioning   [] body mechanics   [] transfers   [] heat/ice application    [] other:      Other Objective/Functional Measures: Pt performed UBE L2 , and added IR/ER with theraband    Pain Level (0-10 scale) post treatment: 0/10    ASSESSMENT/Changes in Function: Pt was able to tolerate increased repetitions and the addition of new exercises well. Patient will continue to benefit from skilled PT services to modify and progress therapeutic interventions, address functional mobility deficits, address ROM deficits, address strength deficits, analyze and address soft tissue restrictions, analyze and cue movement patterns and analyze and modify body mechanics/ergonomics to attain remaining goals. [x]  See Plan of Care  [x]  See progress note/recertification  []  See Discharge Summary         Progress towards goals / Updated goals:  Updated Goals to be accomplished in 4 weeks:  1. Pt will progress to AROM phase without pain increased for improved functional use of UE - MET  2. Pt will report being able to reach a shelf overhead without difficulty- progressing 5-14-18  3. Pt will demonstrate 130 degrees of AROM right shoulder flexion for improved ability for functional tasks Grossly met, but not formally assessed 5/7/2018  4.  Improve FOTO score to 60 to improve ability for daily tasks - progressing     PLAN  []  Upgrade activities as tolerated     [x]  Continue plan of care  []  Update interventions per flow sheet       []  Discharge due to:_  []  Other:_      Gustavo Rashid PT 5/14/2018  1:04 PM    Future Appointments  Date Time Provider Cristhian Knight   5/16/2018 9:30 AM Gustavo Rashid PT [facility]   6/21/2018 8:00 AM MD Rajesh Lauren [address]   12/11/2018 9:30 AM [facility] NURSE [facility] EUGENIE SESAY   12/18/2018 1:45 PM Marlen العلي MD [address] [facility]   4/10/2019 10:45 AM Vinicius Hayes MD [facility]

## 2018-05-16 ENCOUNTER — HOSPITAL ENCOUNTER (OUTPATIENT)
Dept: PHYSICAL THERAPY | Age: 63
Discharge: HOME | End: 2018-05-16
Payer: MEDICARE

## 2018-05-16 PROCEDURE — 97110 THERAPEUTIC EXERCISES: CPT

## 2018-05-16 PROCEDURE — 97112 NEUROMUSCULAR REEDUCATION: CPT

## 2018-05-16 PROCEDURE — 97016 VASOPNEUMATIC DEVICE THERAPY: CPT

## 2018-05-16 NOTE — PROGRESS NOTES
PT DAILY TREATMENT NOTE - [facility]     Patient Name: Moi Auguste  Date:2018  : 1955  [x]  Patient  Verified  Payor: VA MEDICARE / Plan: VA MEDICARE PART A & B / Product Type: Medicare /    In time:9:30  Out time: 1020  Total Treatment Time (min): 50  Total Timed Codes (min): 40  1:1 Treatment Time ( only): 50  Visit #: 1 of 4-8    Treatment Area: Right shoulder pain [M25.511]    SUBJECTIVE  Pain Level (0-10 scale): 0/10  Any medication changes, allergies to medications, adverse drug reactions, diagnosis change, or new procedure performed?: [x] No    [] Yes (see summary sheet for update)  Subjective functional status/changes:   [x] No changes reported      OBJECTIVE    Modality rationale: decrease edema, decrease inflammation and decrease pain to improve the patients ability to improve ADL's.    Min Type Additional Details    [] Estim:  []Unatt       []IFC  []Premod                        []Other:  []w/ice   []w/heat  Position:  Location:    [] Estim: []Att    []TENS instruct  []NMES                    []Other:  []w/US   []w/ice   []w/heat  Position:  Location:    []  Traction: [] Cervical       []Lumbar                       [] Prone          []Supine                       []Intermittent   []Continuous Lbs:  [] before manual  [] after manual    []  Ultrasound: []Continuous   [] Pulsed                           []1MHz   []3MHz W/cm2:  Location:    []  Iontophoresis with dexamethasone         Location: [] Take home patch   [] In clinic    []  Ice     []  heat  []  Ice massage  []  Laser   []  Anodyne Position:  Location:    []  Laser with stim  []  Other:  Position:  Location:   10 [x]  Vasopneumatic Device Pressure:       [x] lo [] med [] hi   Temperature: [x] lo [] med [] hi   [] Skin assessment post-treatment:  []intact []redness- no adverse reaction    []redness  adverse reaction:     32 min Therapeutic Exercise:  [x] See flow sheet :   Rationale: increase ROM and increase strength to improve the patients ability to improve ADL's.    8 min Neuromuscular Re-education:  []  See flow sheet :   Rationale: increase strength, improve coordination and increase proprioception  to improve the patients ability to perform functional tasks          With   [x] TE   [] TA   [] neuro   [] other: Patient Education: [x] Review HEP    [] Progressed/Changed HEP based on:   [] positioning   [] body mechanics   [] transfers   [] heat/ice application    [] other:      Other Objective/Functional Measures: no changes to therex    Pain Level (0-10 scale) post treatment: 0/10    ASSESSMENT/Changes in Function: Pt progressing well with PT. Demonstrates good full can movement without substitution. Patient will continue to benefit from skilled PT services to modify and progress therapeutic interventions, address functional mobility deficits, address ROM deficits, address strength deficits, analyze and address soft tissue restrictions, analyze and cue movement patterns and analyze and modify body mechanics/ergonomics to attain remaining goals. [x]  See Plan of Care  [x]  See progress note/recertification  []  See Discharge Summary         Progress towards goals / Updated goals:  Updated Goals to be accomplished in 4 weeks:  1. Improve FOTO score to 60 to improve ablity for daily tasks  2. Pt will demonstrate ability to reach an overhead shelf without difficulty. - progressing 5-16-18  3. Pt will demonstrate equal B shoulder AROM flexion to improve ability for daily tasks.      PLAN  []  Upgrade activities as tolerated     [x]  Continue plan of care  []  Update interventions per flow sheet       []  Discharge due to:_  []  Other:_      Jacquelynn Lennox, PT 5/16/2018  10:04 AM    Future Appointments  Date Time Provider Cristhian Knight   5/23/2018 10:30 AM [address] [place] [address]   5/25/2018 9:30 AM [place] [facility]   5/29/2018 12:30 PM [place] [facility]   5/31/2018 9:00 AM Jacquelynn Lennox, PT [facility] 6/4/2018 7:30 AM Paula Bennett, PT [facility]   6/7/2018 9:00 AM Paula Bennett, PT [facility]   6/11/2018 9:00 AM [address] [facility]   6/13/2018 9:30 AM Paula Bennett, PT [facility]   6/21/2018 8:00 AM Fadi Vazquez MD [address]   12/11/2018 9:30 AM John Martin [facility]   12/18/2018 1:45 PM MD Carlos Alberto Saez   4/10/2019 10:45 AM MD Carlos Alberto Wall

## 2018-05-23 ENCOUNTER — HOSPITAL ENCOUNTER (OUTPATIENT)
Dept: PHYSICAL THERAPY | Age: 63
Discharge: HOME | End: 2018-05-23
Payer: MEDICARE

## 2018-05-23 PROCEDURE — 97016 VASOPNEUMATIC DEVICE THERAPY: CPT

## 2018-05-23 PROCEDURE — 97110 THERAPEUTIC EXERCISES: CPT

## 2018-05-23 PROCEDURE — 97112 NEUROMUSCULAR REEDUCATION: CPT

## 2018-05-23 NOTE — PROGRESS NOTES
PT DAILY TREATMENT NOTE - [facility]     Patient Name: Penny Tran  Date:2018  : 1955  [x]  Patient  Verified  Payor: VA MEDICARE / Plan: VA MEDICARE PART A & B / Product Type: Medicare /    In time:10:30am  Out time:11:10am  Total Treatment Time (min): 40  Total Timed Codes (min): 30  1:1 Treatment Time ( [address] only): 30   Visit #: 2 of 4-8    Treatment Area: Right shoulder pain [M25.511]    SUBJECTIVE  Pain Level (0-10 scale): 0  Any medication changes, allergies to medications, adverse drug reactions, diagnosis change, or new procedure performed?: [x] No    [] Yes (see summary sheet for update)  Subjective functional status/changes:   [] No changes reported  Pt reports no pain but reports sensation of \"tightness\" in ant shoulder. OBJECTIVE  22 min Therapeutic Exercise:  [x] See flow sheet :   Rationale: increase ROM and increase strength to improve the patients ability to improve ease of ADLs and oH reach. 8 min Neuromuscular Re-education:  [x]  See flow sheet :   Rationale: increase strength and improve coordination  to improve the patients ability to improve ease of ADLs and OH reach. Modality rationale: decrease edema, decrease inflammation and decrease pain to improve the patients ability to improve ease of ADLs.    Min Type Additional Details    [] Estim:  []Unatt       []IFC  []Premod                        []Other:  []w/ice   []w/heat  Position:  Location:    [] Estim: []Att    []TENS instruct  []NMES                    []Other:  []w/US   []w/ice   []w/heat  Position:  Location:    []  Traction: [] Cervical       []Lumbar                       [] Prone          []Supine                       []Intermittent   []Continuous Lbs:  [] before manual  [] after manual    []  Ultrasound: []Continuous   [] Pulsed                           []1MHz   []3MHz Location:  W/cm2:    []  Iontophoresis with dexamethasone         Location: [] Take home patch   [] In clinic    []  Ice     []  heat  [] Ice massage  []  Laser   []  Anodyne Position:  Location:    []  Laser with stim  []  Other: Position:  Location:   10 []  Vasopneumatic Device Pressure:       [x] lo [] med [] hi   Temperature: [x] lo [] med [] hi   [] Skin assessment post-treatment:  []intact []redness- no adverse reaction    []redness  adverse reaction:           With   [] TE   [] TA   [] neuro   [] other: Patient Education: [x] Review HEP    [] Progressed/Changed HEP based on:   [] positioning   [] body mechanics   [] transfers   [] heat/ice application    [] other:      Other Objective/Functional Measures: added wall pushup (+) void of pain or dysfunction noted    Excellent AROM against gravity     Pain Level (0-10 scale) post treatment: 0    ASSESSMENT/Changes in Function: Pt continues to demonstrate excellent AROM and is progressing with very light resistance void of noted limitations. He does report some sensations of \"tightness\" in the anterior shoulder, but no overt pain nor notable ROM limitations. Continue gradual progression towards light strengthening. In the next few weeks. Patient will continue to benefit from skilled PT services to modify and progress therapeutic interventions, address functional mobility deficits, address ROM deficits, address strength deficits, analyze and address soft tissue restrictions, analyze and cue movement patterns and analyze and modify body mechanics/ergonomics to attain remaining goals. []  See Plan of Care  []  See progress note/recertification  []  See Discharge Summary         Progress towards goals / Updated goals:  Updated Goals to be accomplished in 4 weeks:  1. Improve FOTO score to 60 to improve ablity for daily tasks  2. Pt will demonstrate ability to reach an overhead shelf without difficulty. - progressing 5-16-18  3. Pt will demonstrate equal B shoulder AROM flexion to improve ability for daily tasks.      PLAN  [x]  Upgrade activities as tolerated     [x]  Continue plan of care  [] Update interventions per flow sheet       []  Discharge due to:_  []  Other:_      Ada Thakur, PT, DPT, ATC 5/23/2018  10:52 AM    Future Appointments  Date Time Provider Cristhian Knight   5/25/2018 9:30 AM [address] [facility]   5/29/2018 12:30 PM [address] [facility]   5/31/2018 9:00 AM Lissett Trinh, PT [facility]   6/4/2018 7:30 AM Lissett Trinh, PT [facility]   6/7/2018 9:00 AM Lissett Trinh, PT [facility]   6/11/2018 9:00 AM Ada Thakur [facility]   6/13/2018 9:30 AM Lissett Trinh, PT [facility]   6/21/2018 8:00 AM MD Sam Navarro [address]   12/11/2018 9:30 AM [facility]   12/18/2018 1:45 PM MD Carlos Alberto Araiza   4/10/2019 10:45 AM MD Carlos Alberto Michele

## 2018-05-25 ENCOUNTER — HOSPITAL ENCOUNTER (OUTPATIENT)
Dept: PHYSICAL THERAPY | Age: 63
Discharge: HOME | End: 2018-05-25
Payer: MEDICARE

## 2018-05-25 PROCEDURE — 97112 NEUROMUSCULAR REEDUCATION: CPT

## 2018-05-25 PROCEDURE — 97016 VASOPNEUMATIC DEVICE THERAPY: CPT

## 2018-05-25 NOTE — PROGRESS NOTES
PT DAILY TREATMENT NOTE - [facility]     Patient Name: Sera Parmar  Date:2018  : 1955  [x]  Patient  Verified  Payor: VA MEDICARE / Plan: VA MEDICARE PART A & B / Product Type: Medicare /    In time:9:30am  Out time:10:21am  Total Treatment Time (min): 51  Total Timed Codes (min): 41  1:1 Treatment Time ( [address] only): 13   Visit #: 3 of 4-8    Treatment Area: Right shoulder pain [M25.511]    SUBJECTIVE  Pain Level (0-10 scale): 0  Any medication changes, allergies to medications, adverse drug reactions, diagnosis change, or new procedure performed?: [x] No    [] Yes (see summary sheet for update)  Subjective functional status/changes:   [x] No changes reported    OBJECTIVE  29 min Therapeutic Exercise:  [x] See flow sheet :   Rationale: increase ROM and increase strength to improve the patients ability to improve ease of ADLs and return to PLOF. 12 min Neuromuscular Re-education:  [x]  See flow sheet :   Rationale: increase strength and improve coordination  to improve the patients ability to improve ease of ADLs, OH reach, and return to prior activity including bow hunting. Modality rationale: decrease pain to improve the patients ability to improve ease of ADLs.    Min Type Additional Details    [] Estim:  []Unatt       []IFC  []Premod                        []Other:  []w/ice   []w/heat  Position:  Location:    [] Estim: []Att    []TENS instruct  []NMES                    []Other:  []w/US   []w/ice   []w/heat  Position:  Location:    []  Traction: [] Cervical       []Lumbar                       [] Prone          []Supine                       []Intermittent   []Continuous Lbs:  [] before manual  [] after manual    []  Ultrasound: []Continuous   [] Pulsed                           []1MHz   []3MHz Location:  W/cm2:    []  Iontophoresis with dexamethasone         Location: [] Take home patch   [] In clinic    []  Ice     []  heat  []  Ice massage  []  Laser   []  Anodyne Position:  Location: []  Laser with stim  []  Other: Position:  Location:   10 [x]  Vasopneumatic Device Pressure:       [x] lo [] med [] hi   Temperature: [x] lo [] med [] hi   [] Skin assessment post-treatment:  []intact []redness- no adverse reaction    []redness  adverse reaction:             With   [] TE   [] TA   [] neuro   [] other: Patient Education: [x] Review HEP    [] Progressed/Changed HEP based on:   [] positioning   [] body mechanics   [] transfers   [] heat/ice application    [] other:      Other Objective/Functional Measures: added strengthening exercises as per flow     Pain Level (0-10 scale) post treatment: 0    ASSESSMENT/Changes in Function: Pt continues to progress well with AROM and light strengthening interventions. Continue per POC. Patient will continue to benefit from skilled PT services to modify and progress therapeutic interventions, address functional mobility deficits, address ROM deficits, address strength deficits, analyze and cue movement patterns and instruct in home and community integration to attain remaining goals. []  See Plan of Care  []  See progress note/recertification  []  See Discharge Summary         Progress towards goals / Updated goals:  Updated Goals to be accomplished in 4 weeks:  1. Improve FOTO score to 60 to improve ablity for daily tasks  2. Pt will demonstrate ability to reach an overhead shelf without difficulty. - progressing 5-16-18  3. Pt will demonstrate equal B shoulder AROM flexion to improve ability for daily tasks.      PLAN  []  Upgrade activities as tolerated     [x]  Continue plan of care  []  Update interventions per flow sheet       []  Discharge due to:_  []  Other:_      Maria Victoria Ellsworth, PT, DPT, ATC 5/25/2018  11:24 AM    Future Appointments  Date Time Provider Cristhian Knight   5/29/2018 12:30 PM [address] [place] [address]   5/31/2018 9:00 [facility]   6/4/2018 7:30 AM [facility]ul [facility]   6/7/2018 9:00 AM Claudene Bard Lucien [facility]   6/11/2018 9:00 AM Attila Yessica [facility]   6/13/2018 9:30 AM Jenifer Interiano, PT [facility]   6/21/2018 8:00 AM Cristobal Leon MD [facility]   12/11/2018 9:30 AM Cony Schaffer NURSE [facility]   12/18/2018 1:45 PM MD Carlos Alberto Duarte   4/10/2019 10:45 AM MD Carlos Alberto Pat

## 2018-05-29 ENCOUNTER — HOSPITAL ENCOUNTER (OUTPATIENT)
Dept: PHYSICAL THERAPY | Age: 63
Discharge: HOME | End: 2018-05-29
Payer: MEDICARE

## 2018-05-29 PROCEDURE — 97110 THERAPEUTIC EXERCISES: CPT

## 2018-05-29 PROCEDURE — 97112 NEUROMUSCULAR REEDUCATION: CPT

## 2018-05-29 PROCEDURE — 97016 VASOPNEUMATIC DEVICE THERAPY: CPT

## 2018-05-29 NOTE — PROGRESS NOTES
PT DAILY TREATMENT NOTE     Patient Name: Real Stafford  Date:2018  : 1955  [x]  Patient  Verified  Payor: VA MEDICARE / Plan: VA MEDICARE PART A & B / Product Type: Medicare /    In time:12:24pm  Out time:1:18pm  Total Treatment Time (min): 47  Visit #: 4 of 4-8    Treatment Area: Right shoulder pain [M25.511]    SUBJECTIVE  Pain Level (0-10 scale): 0  Any medication changes, allergies to medications, adverse drug reactions, diagnosis change, or new procedure performed?: [x] No    [] Yes (see summary sheet for update)  Subjective functional status/changes:   [] No changes reported  Pt reports some intermittent tightness in his shoulder, otherwise no complaints. OBJECTIVE  33 min Therapeutic Exercise:  [x] See flow sheet :   Rationale: increase ROM and increase strength to improve the patients ability to improve ease of ADLs. 11 min Neuromuscular Re-education:  [x]  See flow sheet :   Rationale: increase strength and improve coordination  to improve the patients ability to improve ease of return to PLOF. Modality rationale: decrease pain to improve the patients ability to improve ADL ease.    Min Type Additional Details    [] Estim:  []Unatt       []IFC  []Premod                        []Other:  []w/ice   []w/heat  Position:  Location:    [] Estim: []Att    []TENS instruct  []NMES                    []Other:  []w/US   []w/ice   []w/heat  Position:  Location:    []  Traction: [] Cervical       []Lumbar                       [] Prone          []Supine                       []Intermittent   []Continuous Lbs:  [] before manual  [] after manual    []  Ultrasound: []Continuous   [] Pulsed                           []1MHz   []3MHz Location:  W/cm2:    []  Iontophoresis with dexamethasone         Location: [] Take home patch   [] In clinic    []  Ice     []  heat  []  Ice massage  []  Laser   []  Anodyne Position:  Location:    []  Laser with stim  []  Other: Position:  Location:   10 [x] Vasopneumatic Device Pressure:       [x] lo [] med [] hi   Temperature: [x] lo [] med [] hi   [] Skin assessment post-treatment:  []intact []redness- no adverse reaction    []redness  adverse reaction:             With   [] TE   [] TA   [] neuro   [] other: Patient Education: [x] Review HEP    [] Progressed/Changed HEP based on:   [] positioning   [] body mechanics   [] transfers   [] heat/ice application    [] other:      Other Objective/Functional Measures: progressed resistance as per flow sheet void of adverse response    Minimal R excessive scapular elevation with interventions    Pain Level (0-10 scale) post treatment: 0    ASSESSMENT/Changes in Function: Pt is progressing well. Progress with light strengthening as tolerated towards DC and HEP over next 2 weeks. Patient will continue to benefit from skilled PT services to modify and progress therapeutic interventions, address functional mobility deficits, address ROM deficits, address strength deficits, analyze and address soft tissue restrictions, analyze and cue movement patterns and instruct in home and community integration to attain remaining goals. []  See Plan of Care  []  See progress note/recertification  []  See Discharge Summary         Progress towards goals / Updated goals:  Updated Goals to be accomplished in 4 weeks:  1. Improve FOTO score to 60 to improve ablity for daily tasks  2. Pt will demonstrate ability to reach an overhead shelf without difficulty. - progressing 5-16-18  3. Pt will demonstrate equal B shoulder AROM flexion to improve ability for daily tasks. Near met with flexion, minimal end range R shoulder scapular elevation compared to L 5/29/2018. PLAN  [x]  Upgrade activities as tolerated     [x]  Continue plan of care  []  Update interventions per flow sheet       []  Discharge due to:_  []  Other:_Continue AROM and gradual strengthening progression towards HEP and DC in next several visits.       Elizabeth Xiao, PT, DPT, ATC 5/29/2018  12:37 PM    Future Appointments  Date Time Provider Cristhian Knight   5/31/2018 9:00 AM Maisha Weiner, PT [facility]   6/4/2018 7:30 AM Maisha Weiner, PT [facility]   6/7/2018 9:00 AM Maisha Weiner, PT [facility]   6/11/2018 9:00 AM [address] [facility]   6/13/2018 9:30 AM Maisha Weiner, PT [facility]   6/21/2018 8:00 AM MD Rajesh [address]aman [address]   12/11/2018 9:30 AM [facility] Edwige Stack [facility]   12/18/2018 1:45 PM Karoline Carrasco MD [address]   4/10/2019 10:45 AM Gerald King MD [address]

## 2018-05-31 ENCOUNTER — HOSPITAL ENCOUNTER (OUTPATIENT)
Dept: PHYSICAL THERAPY | Age: 63
Discharge: HOME | End: 2018-05-31
Payer: MEDICARE

## 2018-05-31 PROCEDURE — 97112 NEUROMUSCULAR REEDUCATION: CPT

## 2018-05-31 PROCEDURE — 97016 VASOPNEUMATIC DEVICE THERAPY: CPT

## 2018-05-31 PROCEDURE — 97110 THERAPEUTIC EXERCISES: CPT

## 2018-05-31 NOTE — PROGRESS NOTES
PT DAILY TREATMENT NOTE     Patient Name: Camryn Su  Date:2018  : 1955  [x]  Patient  Verified  Payor: [place] MEDICARE / Plan: [place] MEDICARE PART A & B / Product Type: Medicare /    In time:0900  Out time: 949  Total Treatment Time (min): 49  Total Timed Codes (min): 39  1:1 Treatment Time ( only): 25  Visit #: 5 of 4-8    Treatment Area: Right shoulder pain [M25.511]    SUBJECTIVE  Pain Level (0-10 scale): 0  Any medication changes, allergies to medications, adverse drug reactions, diagnosis change, or new procedure performed?: [x] No    [] Yes (see summary sheet for update)  Subjective functional status/changes:   [x] No changes reported      OBJECTIVE  28 min Therapeutic Exercise:  [x] See flow sheet :   Rationale: increase ROM and increase strength to improve the patients ability to improve ease of ADLs. 11 min Neuromuscular Re-education:  [x]  See flow sheet :   Rationale: increase strength and improve coordination  to improve the patients ability to improve ease of return to PLOF. Modality rationale: decrease pain to improve the patients ability to improve ADL ease.    Min Type Additional Details    [] Estim:  []Unatt       []IFC  []Premod                        []Other:  []w/ice   []w/heat  Position:  Location:    [] Estim: []Att    []TENS instruct  []NMES                    []Other:  []w/US   []w/ice   []w/heat  Position:  Location:    []  Traction: [] Cervical       []Lumbar                       [] Prone          []Supine                       []Intermittent   []Continuous Lbs:  [] before manual  [] after manual    []  Ultrasound: []Continuous   [] Pulsed                           []1MHz   []3MHz Location:  W/cm2:    []  Iontophoresis with dexamethasone         Location: [] Take home patch   [] In clinic    []  Ice     []  heat  []  Ice massage  []  Laser   []  Anodyne Position:  Location:    []  Laser with stim  []  Other: Position:  Location:   10 [x]  Vasopneumatic Device Pressure:       [x] lo [] med [] hi   Temperature: [x] lo [] med [] hi   [] Skin assessment post-treatment:  []intact []redness- no adverse reaction    []redness  adverse reaction:             With   [] TE   [] TA   [] neuro   [] other: Patient Education: [x] Review HEP    [] Progressed/Changed HEP based on:   [] positioning   [] body mechanics   [] transfers   [] heat/ice application    [] other:      Other Objective/Functional Measures: no changes    Pain Level (0-10 scale) post treatment: 0    ASSESSMENT/Changes in Function:  Pt progressing well with PT. Patient will continue to benefit from skilled PT services to modify and progress therapeutic interventions, address functional mobility deficits, address ROM deficits, address strength deficits, analyze and address soft tissue restrictions, analyze and cue movement patterns and instruct in home and community integration to attain remaining goals. []  See Plan of Care  []  See progress note/recertification  []  See Discharge Summary         Progress towards goals / Updated goals:  Updated Goals to be accomplished in 4 weeks:  1. Improve FOTO score to 60 to improve ablity for daily tasks  2. Pt will demonstrate ability to reach an overhead shelf without difficulty. - progressing 5-16-18  3. Pt will demonstrate equal B shoulder AROM flexion to improve ability for daily tasks. Near met with flexion, minimal end range R shoulder scapular elevation compared to L 5/29/2018.     PLAN  [x]  Upgrade activities as tolerated     [x]  Continue plan of care  []  Update interventions per flow sheet       []  Discharge due to:_  []  Other:_    Adilia Leblanc PT  5/31/2018  9:27 AM    Future Appointments  Date Time Provider Cristhian Knight   6/4/2018 7:30 AM Adilia Leblanc, PT [facility]   6/7/2018 9:00 AM [facility]   6/11/2018 9:00 AM [facility]   6/13/2018 9:30 AM [facility]   6/21/2018 8:00 AM Joanne Barajas Kinnie Ahumada, MD [address]   12/11/2018 9:30 AM [facility] NURSE [facility]   12/18/2018 1:45 PM Steve Fay MD [facility]   4/10/2019 10:45 AM Logan Bence, MD [facility]

## 2018-06-04 ENCOUNTER — HOSPITAL ENCOUNTER (OUTPATIENT)
Dept: PHYSICAL THERAPY | Age: 63
Discharge: HOME | End: 2018-06-04
Payer: MEDICARE

## 2018-06-04 PROCEDURE — 97112 NEUROMUSCULAR REEDUCATION: CPT

## 2018-06-04 PROCEDURE — 97016 VASOPNEUMATIC DEVICE THERAPY: CPT

## 2018-06-04 PROCEDURE — 97110 THERAPEUTIC EXERCISES: CPT

## 2018-06-04 NOTE — PROGRESS NOTES
PT DAILY TREATMENT NOTE - [facility]     Patient Name: Carlos A Martin  Date:2018  : 1955  [x]  Patient  Verified  Payor: VA MEDICARE / Plan: VA MEDICARE PART A & B / Product Type: Medicare /    In time:930  Out time:1021  Total Treatment Time (min): 51  Total Timed Codes (min): 41  1:1 Treatment Time ( [address] only): 36  Visit #: 6 of -8    Treatment Area: Right shoulder pain [M25.511]    SUBJECTIVE  Pain Level (0-10 scale): 0/10  Any medication changes, allergies to medications, adverse drug reactions, diagnosis change, or new procedure performed?: [x] No    [] Yes (see summary sheet for update)  Subjective functional status/changes:   [x] No changes reported    OBJECTIVE    Modality rationale: decrease edema, decrease inflammation and decrease pain to improve the patients ability to perform ADL's.    Min Type Additional Details    [] Estim:  []Unatt       []IFC  []Premod                        []Other:  []w/ice   []w/heat  Position:  Location:    [] Estim: []Att    []TENS instruct  []NMES                    []Other:  []w/US   []w/ice   []w/heat  Position:  Location:    []  Traction: [] Cervical       []Lumbar                       [] Prone          []Supine                       []Intermittent   []Continuous Lbs:  [] before manual  [] after manual    []  Ultrasound: []Continuous   [] Pulsed                           []1MHz   []3MHz W/cm2:  Location:    []  Iontophoresis with dexamethasone         Location: [] Take home patch   [] In clinic    []  Ice     []  heat  []  Ice massage  []  Laser   []  Anodyne Position:  Location:    []  Laser with stim  []  Other:  Position:  Location:   10 [x]  Vasopneumatic Device Pressure:       [] lo [] med [x] hi   Temperature: [x] lo [] med [] hi   [] Skin assessment post-treatment:  []intact []redness- no adverse reaction    []redness  adverse reaction:     31 min Therapeutic Exercise:  [x] See flow sheet :   Rationale: increase ROM and increase strength to improve the patients ability to perform functional tasks. 10 min Neuromuscular Re-education:  [x]  See flow sheet :   Rationale: increase ROM, increase strength, improve coordination and increase proprioception  to improve the patients ability to perform ADL's. With   [x] TE   [] TA   [] neuro   [] other: Patient Education: [x] Review HEP    [] Progressed/Changed HEP based on:   [] positioning   [] body mechanics   [] transfers   [] heat/ice application    [] other:      Other Objective/Functional Measures: Increased resistance for elbow flexion,bent over rows, tricep extension, and cane exercises. Also, increased repetitions for bowstring exercise. Pain Level (0-10 scale) post treatment: 0/10    ASSESSMENT/Changes in Function: Pt tolerated therapeutic exercise program progression well. Patient will continue to benefit from skilled PT services to modify and progress therapeutic interventions, address functional mobility deficits, address ROM deficits, address strength deficits, analyze and address soft tissue restrictions, analyze and cue movement patterns, analyze and modify body mechanics/ergonomics and assess and modify postural abnormalities to attain remaining goals. [x]  See Plan of Care  []  See progress note/recertification  []  See Discharge Summary         Progress towards goals / Updated goals:  Updated Goals to be accomplished in 4 weeks:  1. Improve FOTO score to 60 to improve ablity for daily tasks  2. Pt will demonstrate ability to reach an overhead shelf without difficulty. - progressing 5-16-18  3. Pt will demonstrate equal B shoulder AROM flexion to improve ability for daily tasks.  Near met with flexion, minimal end range R shoulder scapular elevation compared to L 5/29/2018.       PLAN  []  Upgrade activities as tolerated     [x]  Continue plan of care  []  Update interventions per flow sheet       []  Discharge due to:_  []  Other:_      Lissett Trinh, PT 6/4/2018  8:44 AM    Future Appointments  Date Time Provider Cristhian Knight   6/7/2018 9:00 AM Joni Juarez, PT [facility]   6/11/2018 9:00 AM [address] [facility]   6/13/2018 9:30 AM Joni Juarez, PT [facility]   6/19/2018 8:20 AM Percy Gamez MD [address]   12/11/2018 9:30 AM [facility] NURSE [facility]   12/18/2018 1:45 PM Arsen Diaz MD [facility]   4/10/2019 10:45 AM Perla Cutler MD [facility]

## 2018-06-07 ENCOUNTER — HOSPITAL ENCOUNTER (OUTPATIENT)
Dept: PHYSICAL THERAPY | Age: 63
Discharge: HOME | End: 2018-06-07
Payer: MEDICARE

## 2018-06-07 PROCEDURE — 97112 NEUROMUSCULAR REEDUCATION: CPT

## 2018-06-07 PROCEDURE — 97110 THERAPEUTIC EXERCISES: CPT

## 2018-06-07 PROCEDURE — 97016 VASOPNEUMATIC DEVICE THERAPY: CPT

## 2018-06-07 NOTE — PROGRESS NOTES
PT DAILY TREATMENT NOTE - [facility]     Patient Name: Kiko Ahumada  Date:2018  : 1955  [x]  Patient  Verified  Payor: VA MEDICARE / Plan: VA MEDICARE PART A & B / Product Type: Medicare /    In time:900  Out time:950  Total Treatment Time (min): 50  Total Timed Codes (min): 40  1:1 Treatment Time ( only): 40   Visit #: 7 of 4-8    Treatment Area: Right shoulder pain [M25.511]    SUBJECTIVE  Pain Level (0-10 scale): 0/10  Any medication changes, allergies to medications, adverse drug reactions, diagnosis change, or new procedure performed?: [x] No    [] Yes (see summary sheet for update)  Subjective functional status/changes:   [] No changes reported  Pt reports maybe overdoing it at the gym the other night while exercising. He states being a little sore. OBJECTIVE    Modality rationale: decrease edema, decrease inflammation and decrease pain to improve the patients ability to perform ADL's.    Min Type Additional Details    [] Estim:  []Unatt       []IFC  []Premod                        []Other:  []w/ice   []w/heat  Position:  Location:    [] Estim: []Att    []TENS instruct  []NMES                    []Other:  []w/US   []w/ice   []w/heat  Position:  Location:    []  Traction: [] Cervical       []Lumbar                       [] Prone          []Supine                       []Intermittent   []Continuous Lbs:  [] before manual  [] after manual    []  Ultrasound: []Continuous   [] Pulsed                           []1MHz   []3MHz W/cm2:  Location:    []  Iontophoresis with dexamethasone         Location: [] Take home patch   [] In clinic    []  Ice     []  heat  []  Ice massage  []  Laser   []  Anodyne Position:  Location:    []  Laser with stim  []  Other:  Position:  Location:   10 [x]  Vasopneumatic Device Pressure:       [] lo [] med [x] hi   Temperature: [x] lo [] med [x] hi   [] Skin assessment post-treatment:  []intact []redness- no adverse reaction    []redness  adverse reaction:     30 min Therapeutic Exercise:  [x] See flow sheet :   Rationale: increase ROM and increase strength to improve the patients ability to perform functional tasks. 10 min Neuromuscular Re-education:  [x]  See flow sheet :   Rationale: increase ROM, increase strength, improve coordination and increase proprioception  to improve the patients QOL. With   [x] TE   [] TA   [] neuro   [] other: Patient Education: [x] Review HEP    [] Progressed/Changed HEP based on:   [] positioning   [] body mechanics   [] transfers   [] heat/ice application    [] other:      Other Objective/Functional Measures: Increased resistance for arm curls     Pain Level (0-10 scale) post treatment: 0/10    ASSESSMENT/Changes in Function: Pt reports discomfort with wall slides into IR direction, and isometrics w/ ER. He is continuing to improve but still has strength deficits to address. Patient will continue to benefit from skilled PT services to modify and progress therapeutic interventions, address functional mobility deficits, address ROM deficits, address strength deficits, analyze and address soft tissue restrictions, analyze and cue movement patterns, analyze and modify body mechanics/ergonomics and assess and modify postural abnormalities to attain remaining goals. []  See Plan of Care  []  See progress note/recertification  []  See Discharge Summary         Progress towards goals / Updated goals:  1. Improve FOTO score to 60 to improve ablity for daily tasks  2. Pt will demonstrate ability to reach an overhead shelf without difficulty. - progressing 5-16-18  3. Pt will demonstrate equal B shoulder AROM flexion to improve ability for daily tasks.  Near met with flexion, minimal end range R shoulder scapular elevation compared to L 5/29/2018.       PLAN  []  Upgrade activities as tolerated     [x]  Continue plan of care  []  Update interventions per flow sheet       []  Discharge due to:_  []  Other:_      Anamika Velázquez, PT 6/7/2018  11:34 AM    Future Appointments  Date Time Provider Department Center   6/11/2018 9:00 AM [address] [facility]   6/13/2018 9:30 AM [facility]   6/19/2018 8:20 AM Diomedes Ferro MD [place] [id number]   12/11/2018 9:30 AM [facility] NURSE [facility] SCH[facility]   12/18/2018 1:45 PM MD [facility]   4/10/2019 10:45 AM MD Carlos Alberto Herrera

## 2018-06-11 ENCOUNTER — HOSPITAL ENCOUNTER (OUTPATIENT)
Dept: PHYSICAL THERAPY | Age: 63
Discharge: HOME | End: 2018-06-11
Payer: MEDICARE

## 2018-06-11 PROCEDURE — 97016 VASOPNEUMATIC DEVICE THERAPY: CPT

## 2018-06-11 PROCEDURE — 97110 THERAPEUTIC EXERCISES: CPT

## 2018-06-11 NOTE — PROGRESS NOTES
PT DAILY TREATMENT NOTE - [facility]     Patient Name: Coco Beaulieu  Date:2018  : 1955  [x]  Patient  Verified  Payor: VA MEDICARE / Plan: VA MEDICARE PART A & B / Product Type: Medicare /    In time:9:00am  Out time:9:46am  Total Treatment Time (min): 46  Total Timed Codes (min): 36  1:1 Treatment Time ( [address] only): 36   Visit #: 8 of 4-8    Treatment Area: Right shoulder pain [M25.511]    SUBJECTIVE  Pain Level (0-10 scale): 0  Any medication changes, allergies to medications, adverse drug reactions, diagnosis change, or new procedure performed?: [x] No    [] Yes (see summary sheet for update)  Subjective functional status/changes:   [] No changes reported  Pt reports some sensation of \"tightness\" in his anterolateral shoulder. Otherwise no complaints. He reports he was putting in some light fixtures over the weekend and his shoulder was fatigued and a little sore. OBJECTIVE    36 min Therapeutic Exercise:  [x] See flow sheet :   Rationale: increase ROM and increase strength to improve the patients ability to improve ease of ADLs. Modality rationale: post exercise soreness to improve the patients ability to improve QOL.    Min Type Additional Details    [] Estim:  []Unatt       []IFC  []Premod                        []Other:  []w/ice   []w/heat  Position:  Location:    [] Estim: []Att    []TENS instruct  []NMES                    []Other:  []w/US   []w/ice   []w/heat  Position:  Location:    []  Traction: [] Cervical       []Lumbar                       [] Prone          []Supine                       []Intermittent   []Continuous Lbs:  [] before manual  [] after manual    []  Ultrasound: []Continuous   [] Pulsed                           []1MHz   []3MHz Location:  W/cm2:    []  Iontophoresis with dexamethasone         Location: [] Take home patch   [] In clinic    []  Ice     []  heat  []  Ice massage  []  Laser   []  Anodyne Position:  Location:    []  Laser with stim  []  Other: Position:  Location:   10 [x]  Vasopneumatic Device Pressure:       [x] lo [] med [] hi   Temperature: [x] lo [] med [] hi   [] Skin assessment post-treatment:  []intact []redness- no adverse reaction    []redness  adverse reaction:          With   [] TE   [] TA   [] neuro   [] other: Patient Education: [x] Review HEP    [] Progressed/Changed HEP based on:   [] positioning   [] body mechanics   [] transfers   [] heat/ice application    [] other:      Other Objective/Functional Measures: reports some sensations of \"tightness\" in shoulder and intermittent \"clicking\" reported that appears to occur only with end range extension movements, abolished with cues to limit terminal shoulder extension    Full R shoulder AROM    Grossly 4+/5 R shoulder strength or better    Pain Level (0-10 scale) post treatment: 0    ASSESSMENT/Changes in Function: Pt is progressing well demonstrating full R shoulder AROM and progressing well with strength. At this time, he has been advised to DC from skilled PT and continue slow gradual progression of strengthening over the next few months. Advised pt to continue with low load higher repetition exercises to start and gradually progress. DC from PT.    []  See Plan of Care  []  See progress note/recertification  [x]  See Discharge Summary         Progress towards goals / Updated goals:  1. Improve FOTO score to 60 to improve ablity for daily tasks 58 near met 5/29/2018  2. Pt will demonstrate ability to reach an overhead shelf without difficulty. - progressing 5-16-18 Met 6/11/2018  3. Pt will demonstrate equal B shoulder AROM flexion to improve ability for daily tasks. Near met with flexion, minimal end range R shoulder scapular elevation compared to L 5/29/2018. Met 6/11/2018    PLAN  []  Upgrade activities as tolerated     []  Continue plan of care  []  Update interventions per flow sheet       [x]  Discharge due to:_Goals met or near met, pt progressing with independent strengthening.   [] Other:_      Chely Ivory, PT, DPT, ATC 6/11/2018  10:22 AM    Future Appointments  Date Time Provider Department Center   6/13/2018 9:30 AM [facility]   6/19/2018 8:20 AM Vlad Wheat MD [place] [id number]   12/11/2018 9:30 AM [facility]   12/18/2018 1:45 PM Judith Sarah MD [facility]   4/10/2019 10:45 AM Vishnu Olivo MD [facility]

## 2018-06-11 NOTE — PROGRESS NOTES
[facility]  [address]  [place], [address]  [phone] fax    Physical Therapy Discharge Summary    Patient name: Tani Lo Start of Care: 3/16/2018   Referral source: Jose Armando Solomon,* : 1955   Medical/Treatment Diagnosis: Right shoulder pain [M25.511] Onset Date:3/15/2018     Prior Hospitalization: see medical history Provider#: [id number]   Medications: Verified on Patient Summary List     Comorbidities: DM, L shoulder surgery, OA   Prior Level of Function: functionally I with all activities   Visits from Start of Care: 21    Missed Visits: 0  Reporting Period : 3/16/2018 to 2018    Summary of Care:  Progress towards goals / Updated goals:  1. Improve FOTO score to 60 to improve ablity for daily tasks 58 near met 2018  2. Pt will demonstrate ability to reach an overhead shelf without difficulty. - progressing 18 Met 2018  3. Pt will demonstrate equal B shoulder AROM flexion to improve ability for daily tasks. Near met with flexion, minimal end range R shoulder scapular elevation compared to L 2018. Met 2018    G-Codes (GP)    Carry    Goal  CK= 40-59%   D/C  CK= 40-59%  The severity rating is based on clinical judgment and the FOTO score. ASSESSMENT/RECOMMENDATIONS:  Pt is progressing well demonstrating full R shoulder AROM and progressing well with strength. At this time, he has been advised to DC from skilled PT and continue slow gradual progression of strengthening over the next few months. Advised pt to continue with low load higher repetition exercises to start and gradually progress.  DC from PT.    [x]Discontinue therapy: [x]Patient has reached or is progressing toward set goals      []Patient is non-compliant or has abdicated      []Due to lack of appreciable progress towards set goals    Atitla Yessica, PT, DPT, ATC 2018 11:19 AM

## 2018-06-13 ENCOUNTER — APPOINTMENT (OUTPATIENT)
Dept: PHYSICAL THERAPY | Age: 63
End: 2018-06-13
Payer: MEDICARE

## 2018-06-19 ENCOUNTER — OFFICE VISIT (OUTPATIENT)
Dept: ORTHOPEDIC SURGERY | Age: 63
End: 2018-06-19

## 2018-06-19 VITALS
HEART RATE: 78 BPM | WEIGHT: 238 LBS | HEIGHT: 73 IN | BODY MASS INDEX: 31.54 KG/M2 | OXYGEN SATURATION: 97 % | SYSTOLIC BLOOD PRESSURE: 115 MMHG | DIASTOLIC BLOOD PRESSURE: 64 MMHG | RESPIRATION RATE: 16 BRPM | TEMPERATURE: 96 F

## 2018-06-19 DIAGNOSIS — M75.121 COMPLETE TEAR OF RIGHT ROTATOR CUFF: Primary | ICD-10-CM

## 2018-06-19 DIAGNOSIS — Z98.890 STATUS POST RIGHT ROTATOR CUFF REPAIR: ICD-10-CM

## 2018-06-19 NOTE — PROGRESS NOTES
Gely Pacheco  1955     HISTORY OF PRESENT ILLNESS  Gely Pacheco is a 70 y.o. male who presents today for evaluation s/p Right shoulder arthroscopic rotator cuff repair on 3/15/18. Patient has finished PT. Describes pain as a 0/10. Patient's pain has improved and has increased mobility. He is able to reach his head comfortably. Patient still notes soreness in shoulder. Patient still has some muscle weakness in shoulder. Patient states the arm feels great. Still feels some tightness but it is improving. Patient denies any fever, chills, chest pain, shortness of breath or calf pain. There are no new illness or injuries to report since last seen in the office. PHYSICAL EXAM:   Visit Vitals    /64 (BP 1 Location: Left arm, BP Patient Position: Sitting)    Pulse 78    Temp 96 °F (35.6 °C) (Oral)    Resp 16    Ht 6' 1\" (1.854 m)    Wt 238 lb (108 kg)    SpO2 97%    BMI 31.4 kg/m2      The patient is a well-developed, well-nourished male in no acute distress. The patient is alert and oriented times three. The patient appears to be well groomed. Mood and affect are normal.  LYMPHATIC: lymph nodes are not enlarged and are within normal limits  SKIN: normal in color and non tender to palpation. There are no bruises or abrasions noted. NEUROLOGICAL: Motor sensory exam is within normal limits. Reflexes are equal bilaterally. There is normal sensation to pinprick and light touch  ORTHOPEDIC EXAM of right shoulder:  Inspection: swelling not present,  Bruising not present  Incisions well healed  Passive glenohumeral abduction 0-90 degrees  Stability: Stable  Strength: 4/5  2+ distal pulses    IMPRESSION:  S/P Right shoulder arthroscopic rotator cuff repair    PLAN:   1. I am hopeful the patient will continue to improve the mobility and strength in the shoulder. Gradually increase activities as tolerated. Risk factors include: DM,  2. No ultrasound exam indicated today  3.  No cortisone injection indicated today   4. No Physical/Occupational Therapy indicated today  5. No diagnostic test indicated today:   6. No durable medical equipment indicated today  7. No referral indicated today   8. No medications indicated today:   9. No Narcotic indicated today     RTC prn    Scribed by Jonah Vora [facility]) as dictated by Cristobal Leon MD    I, Dr. Cristobal Leon, confirm that all documentation is accurate.     Cristobal Leon M.D.   [facility]

## 2018-08-15 ENCOUNTER — OFFICE VISIT (OUTPATIENT)
Dept: PAIN MANAGEMENT | Age: 63
End: 2018-08-15

## 2018-08-15 VITALS
TEMPERATURE: 96 F | WEIGHT: 230 LBS | RESPIRATION RATE: 14 BRPM | SYSTOLIC BLOOD PRESSURE: 126 MMHG | HEART RATE: 73 BPM | HEIGHT: 73 IN | BODY MASS INDEX: 30.48 KG/M2 | DIASTOLIC BLOOD PRESSURE: 71 MMHG

## 2018-08-15 DIAGNOSIS — M47.816 LUMBAR FACET ARTHROPATHY: ICD-10-CM

## 2018-08-15 DIAGNOSIS — M47.816 LUMBAR SPONDYLOSIS: Primary | ICD-10-CM

## 2018-08-15 DIAGNOSIS — M43.16 SPONDYLOLISTHESIS, LUMBAR REGION: ICD-10-CM

## 2018-08-15 DIAGNOSIS — G89.4 CHRONIC PAIN SYNDROME: ICD-10-CM

## 2018-08-15 NOTE — PROGRESS NOTES
[facility]  Interventional Pain Management Consultation History & Physical    PATIENT NAME:  Beatriz Silva     YOB: 1955    DATE OF SERVICE:   8/15/2018      CHIEF COMPLAINT:  Back Pain      REASON FOR VISIT:   Beatriz Silva presents to the pain clinic today for follow on evaluation and to consider interventional pain management options as indicated for the type and location of the pain the patient is presenting with. HISTORY OF PRESENT ILLNESS:    Margo Harris presents for reevaluation and to reconsider further procedures as may be indicated. I saw this gentleman just over a year ago. We treated him almost a year ago. He had bilateral lumbar radiofrequency neurotomy procedures, at bilateral L3-4, L4 is 5, L5-S1 levels with IV conscious sedation. He tolerated these procedures exceptionally well. They offered almost 1 year of very very good lumbar pain relief. His low back pain has begun to return. He endorses low back pain without radiating or radicular lower extremity pain symptoms. Aching throbbing low back pain is endorsed. His pain is increased with lumbar facet loading maneuvers including lumbar twisting turning, lumbar extension. His lumbar MRI was reviewed. This demonstrates lumbar facet arthropathy lumbar facet hypertrophy. He has had no other interval medical changes of significance other than having a shoulder operation about 3 months ago. He is recovering well from this. ASSESSMENT/OPTIONS: as follows. We discussed options. We will plan repeating his lumbar radiofrequency neurotomy procedures at bilateral L3-4, L4-5, L5 S1 levels with IV conscious sedation. I have discussed the risks and benefits, indications, contraindications, and side effects of intended procedure with the patient. I have used skeleton spine model to describe and discuss the procedure with the patient.   I have answered all questions relating to the procedure. Patient understands the nature of the procedure and wishes to proceed. Patient has no further questions. 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    PAST SURGICAL HISTORY:   The patient  has a past surgical history that includes hx hernia repair; pr anesth,surgery of shoulder; hx other surgical; hx orthopaedic (2003 & 2004); hx knee arthroscopy (Bilateral); and hx orthopaedic. CURRENT MEDICATIONS:   The patient has a current medication list which includes the following prescription(s): sildenafil (antihypertensive), alfuzosin sr, dutasteride, voltaren, ascorbic acid (vitamin c), omega 3-dha-epa-fish oil, ginkgo biloba/panax ginseng rt, metaxalone, freestyle lite strips, piroxicam, azelastine, lidocaine, simvastatin, multivitamin, aspirin delayed-release, cyanocobalamin, calcium polycarbophil, oxycodone-acetaminophen, and cetirizine. ALLERGIES:     Allergies   Allergen Reactions    Other Medication Unable to Obtain     Dust, mold and dander       FAMILY HISTORY:   The patient He was adopted. Family history is unknown by patient. SOCIAL HISTORY:   The patient  reports that he quit smoking about 32 years ago. He has never used smokeless tobacco. The patient  reports that he does not drink alcohol.  He      REVIEW OF SYSTEMS:    The patient denies fever, chills, weight loss (Constitutional), rash, itching (Skin), tinnitus, congestion (HENT), blurred vision, photophobia (Eyes), palpitations, orthopnea (Cardiovascular), hemoptysis, wheezing (Respiratory), nausea, vomiting, diarrhea (Gastrointestinal), dysuria, hematuria, urgency (Genitourinary), bowel or bladder incontinence, loss of consciousness (Neurologic), suicidal or homicidal ideation or hallucinations (Psychiatric). Denies swelling, axillary or groin masses (Lymphatic). PHYSICAL EXAM:  VS:   Visit Vitals    /71 (BP 1 Location: Right arm, BP Patient Position: Sitting)    Pulse 73    Temp 96 °F (35.6 °C) (Oral)    Resp 14    Ht 6' 1\" (1.854 m)    Wt 104.3 kg (230 lb)    BMI 30.34 kg/m2     General: Well-developed and well-nourished. Body habitus consistent with recorded height and weight and the calculated BMI. Apparent distress due to low back pain. Head: Normocephalic, atraumatic. Skin: Inspection of the skin reveals no rashes, lesions or infection. CV: Regular rate. No murmurs or rubs noted. No peripheral edema noted. Pulm: Respirations are even and unlabored. Extr: No clubbing, cyanosis, or edema noted. Musculoskeletal:  1. Cervical spine  Full ROM. No paraspinous tenderness at any level. There is no scoliosis, asymmetry, or musculoskeletal defect. 2. Thoracic spine  Full ROM. No paraspinous tenderness at any level. There is no scoliosis, asymmetry, or musculoskeletal defect. 3. Lumbar spine decreased range of motion all [place] . Paraspinous tenderness bilaterally . SI joints are nontender bilaterally. There is no scoliosis, asymmetry, or musculoskeletal defect. 4. Right upper extremity  Full ROM. 5/5 muscle strength in all muscle groups. No pain or tenderness in shoulder, elbow, wrist, or hand. 5. Left upper extremity  Full ROM. 5/5 muscle strength in all muscle groups. No pain or tenderness in shoulder, elbow, wrist, or hand. 6. Right lower extremity  Full ROM. 5/5 muscle strength in all muscle groups. No pain, tenderness, or swelling in the hip, knee, ankle or foot. 7. Left lower extremity  Full ROM. 5/5 muscle strength in all muscle groups.   No pain, tenderness, or swelling in the hip, knee, ankle or foot. Neurological:  1. Mental Status - Alert, awake and oriented. Speech is clear and appropriate. 2. Cranial Nerves - Extraocular muscles intact bilaterally. Cranial nerves II-XII grossly intact bilaterally. 3. Gait - antalgic   4. Reflexes - 2+ and symmetric throughout. 5. Sensation - Intact to light touch and pin prick. 6. Provocative Tests - Spurlings negative bilaterally. Straight leg raise negative bilaterally. Psychological:  1. Mood and affect  Appropriate. 2. Speech  Appropriate. 3. Though content  Appropriate. 4. Judgment  Appropriate. ASSESSMENT:      ICD-10-CM ICD-9-CM    1. Lumbar spondylosis M47.816 721.3    2. Lumbar facet arthropathy (HCC) M46.96 721.3    3. Spondylolisthesis, lumbar region M43.16 738.4    4. Chronic pain syndrome G89.4 338.4            PLAN:    1.    I have thoroughly discussed the risks and benefits, indications, contraindications, and side effects of any/all procedures that were mentioned at today's patient visit. I have used a skeleton spine model when indicated to explain all procedures, as well as to provide added emphasis regarding procedures and as well for patient education purposes. I have answered all questions in great detail, and I have obtained verbal and written confirmation for all procedures planned with the patient. 3.    I have reviewed in great detail today, when indicated, the patient's MRI and other imaging studies with the patient. I have explained to the patient, when indicated, their condition using both actual recent and relevant images insofar as I am able to obtain these images. I have used a skeleton spine model for added emphasis as well as for patient education. 4.    I have advised patient to have a primary care provider continue to care for their health maintenance and general medical conditions.    5,    I have placed appropriate referrals to specialty care providers as I have deemed necessary through today's clinical consultation with the patient. 5.    I have explained to the patient that if any significant side effects, issues, problems, concerns, or perceived complications as may arise at around the time of the patient's procedures, they should either call the pain management clinic or go to the emergency room immediately for medical provider evaluation. 6.   I have encouraged all patients to call the pain management clinic with any questions or concerns that they may have pertaining to their procedures. DISPOSITION:   The patients condition and plan were discussed at length and all questions were answered. The patient agrees with the plan. A total of 25 minutes was spent with the patient of which over half of the time was spent counseling the patient. Merlene Ruth MD 8/15/2018 2:34 PM    Note: Although these clinic notes were documented by the provider at the time of the exam, they have not been proofed and are subject to transcription variance.

## 2018-08-20 RX ORDER — MIDAZOLAM HYDROCHLORIDE 1 MG/ML
.5-6 INJECTION, SOLUTION INTRAMUSCULAR; INTRAVENOUS
Status: CANCELLED | OUTPATIENT
Start: 2018-08-21

## 2018-08-20 RX ORDER — SODIUM CHLORIDE 0.9 % (FLUSH) 0.9 %
5-10 SYRINGE (ML) INJECTION AS NEEDED
Status: CANCELLED | OUTPATIENT
Start: 2018-08-21

## 2018-08-21 ENCOUNTER — HOSPITAL ENCOUNTER (OUTPATIENT)
Age: 63
Setting detail: OUTPATIENT SURGERY
Discharge: HOME | End: 2018-08-21
Attending: PHYSICAL MEDICINE & REHABILITATION | Admitting: PHYSICAL MEDICINE & REHABILITATION
Payer: MEDICARE

## 2018-08-21 ENCOUNTER — APPOINTMENT (OUTPATIENT)
Dept: GENERAL RADIOLOGY | Age: 63
End: 2018-08-21
Attending: PHYSICAL MEDICINE & REHABILITATION
Payer: MEDICARE

## 2018-08-21 VITALS
WEIGHT: 230 LBS | RESPIRATION RATE: 16 BRPM | SYSTOLIC BLOOD PRESSURE: 111 MMHG | BODY MASS INDEX: 30.48 KG/M2 | OXYGEN SATURATION: 97 % | HEIGHT: 73 IN | DIASTOLIC BLOOD PRESSURE: 73 MMHG | TEMPERATURE: 98.5 F | HEART RATE: 64 BPM

## 2018-08-21 LAB — GLUCOSE BLD STRIP.AUTO-MCNC: 115 MG/DL (ref 70–110)

## 2018-08-21 PROCEDURE — 74011250636 HC RX REV CODE- 250/636

## 2018-08-21 PROCEDURE — 76010000009 HC PAIN MGT 0 TO 30 MIN PROC: Performed by: PHYSICAL MEDICINE & REHABILITATION

## 2018-08-21 PROCEDURE — 74011250636 HC RX REV CODE- 250/636: Performed by: PHYSICAL MEDICINE & REHABILITATION

## 2018-08-21 PROCEDURE — 82962 GLUCOSE BLOOD TEST: CPT

## 2018-08-21 PROCEDURE — 99152 MOD SED SAME PHYS/QHP 5/>YRS: CPT

## 2018-08-21 RX ORDER — FENTANYL CITRATE 50 UG/ML
INJECTION, SOLUTION INTRAMUSCULAR; INTRAVENOUS AS NEEDED
Status: DISCONTINUED | OUTPATIENT
Start: 2018-08-21 | End: 2018-08-21 | Stop reason: HOSPADM

## 2018-08-21 RX ORDER — LIDOCAINE HYDROCHLORIDE 10 MG/ML
INJECTION, SOLUTION EPIDURAL; INFILTRATION; INTRACAUDAL; PERINEURAL AS NEEDED
Status: DISCONTINUED | OUTPATIENT
Start: 2018-08-21 | End: 2018-08-21 | Stop reason: HOSPADM

## 2018-08-21 RX ORDER — SODIUM CHLORIDE 0.9 % (FLUSH) 0.9 %
5-10 SYRINGE (ML) INJECTION AS NEEDED
Status: DISCONTINUED | OUTPATIENT
Start: 2018-08-21 | End: 2018-08-21 | Stop reason: HOSPADM

## 2018-08-21 RX ORDER — MIDAZOLAM HYDROCHLORIDE 1 MG/ML
.5-6 INJECTION, SOLUTION INTRAMUSCULAR; INTRAVENOUS
Status: DISCONTINUED | OUTPATIENT
Start: 2018-08-21 | End: 2018-08-21 | Stop reason: HOSPADM

## 2018-08-21 RX ORDER — LIDOCAINE HYDROCHLORIDE 20 MG/ML
INJECTION, SOLUTION EPIDURAL; INFILTRATION; INTRACAUDAL; PERINEURAL AS NEEDED
Status: DISCONTINUED | OUTPATIENT
Start: 2018-08-21 | End: 2018-08-21 | Stop reason: HOSPADM

## 2018-08-21 RX ORDER — DEXAMETHASONE SODIUM PHOSPHATE 100 MG/10ML
INJECTION INTRAMUSCULAR; INTRAVENOUS AS NEEDED
Status: DISCONTINUED | OUTPATIENT
Start: 2018-08-21 | End: 2018-08-21 | Stop reason: HOSPADM

## 2018-08-21 NOTE — INTERVAL H&P NOTE
H&P Update:  Myke Roberts was seen and examined. History and physical has been reviewed. The patient has been examined. There have been no significant clinical changes since the completion of the originally dated History and Physical.    [facility]  Brief Pre-Procedure History & Physical    PATIENT NAME:  Myke Roberts   YOB: 1955  DATE OF SERVICE:  8/21/2018      CHIEF COMPLAINT:  Pain    HISTORY OF PRESENT ILLNESS:  Myke Roberts presents today for a previously diagnosed problem contributing to some or all of this patients pain. The location and pattern of the pain has not changed substantially since the last visit in our office. No other significant medical changes have occurred in the last 30 days. 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    PAST SURGICAL HISTORY:  The patient  has a past surgical history that includes hx hernia repair; pr anesth,surgery of shoulder; hx other surgical; hx orthopaedic (2003 & 2004); hx knee arthroscopy (Bilateral); and hx orthopaedic. CURRENT MEDICATIONS:  See Medication Administration Record [facility]) in the patient's electronic record. ALLERGIES:    Allergies   Allergen Reactions    Other Medication Unable to Obtain     Dust, mold and dander       FAMILY HISTORY:  The patient He was adopted. Family history is unknown by patient. SOCIAL HISTORY:  The patient  reports that he quit smoking about 32 years ago. He has never used smokeless tobacco. The patient  reports that he does not drink alcohol. He  reports that he does not use illicit drugs.      REVIEW OF SYSTEMS:  Myke Roberts denies any fever, chills, unexplained weight loss, use of antibiotics for recent infection or bleeding abnormalities. PHYSICAL EXAM:  VS:   Visit Vitals    /79 (BP 1 Location: Left arm, BP Patient Position: Sitting)    Pulse 64    Temp 98.5 °F (36.9 °C)    Resp 20    Ht 6' 1\" (1.854 m)    Wt 104.3 kg (230 lb)    SpO2 97%    BMI 30.34 kg/m2     Gen: Well-developed. Body habitus consistent with recorded height and weight and the calculated BMI. No apparent distress. Head: Normocephalic, atraumatic. Skin: No obvious rashes, lesions or infection. Pulm: Respirations are even and unlabored. Psych:    Mood, affect and speech  Appropriate. ASSESSMENT:   1. Stable for right lumbar RFA L3-5 interventional pain procedure as discussed. PLAN:  Proceed with scheduled procedure.      Anil Torres MD 8/21/2018 8:16 AM        Signed By: Anil Torres MD     August 21, 2018 8:16 AM

## 2018-08-21 NOTE — DISCHARGE INSTRUCTIONS
[address] [place] [address] for Pain Management      Post Procedures Instructions    *Resume Diet and Activity as tolerated. Rest for the remainder of the day. *You may fell worse before you feel better as the numbing medications wear off before the steroids take effect if used for your procedures. *Do not use affected extremity until numbness or loss of sensation has completely resolved without assistance. *DO NOT DRIVE, operate machinery/heavey equipment for 24 hours. *DO NOT DRINK ALCOHOL for 24 hours as it may interact with the sedation if you received it and also thins your blood and may cause you to bleed. *WAIT 24 hours before starting back ANY Blood thinning medications:   (Heparin, Coumadin, Warfarin, Lovenox, Plavix, Aggrenox)    *Resume Pre-Procedure Medications as prescribed except Blood Thinners unless directed by your Physician or Cardiologist.     *Avoid Hot tubs and Heating pad for 24 hours to prevent dissipation of medications, you may shower to remove bandages and remaining prep residue on the skin. * If you develop a Headache, drink plenty of fluids including beverages with caffeine (Coffee, Mt. Dew etc.) and rest.  If the headache persists longer than 24 hoursor intensifies - Please call [facility] ([facility]) [phone]    * If you are DIABETIC, check your blood sugar three times a day for the next three days, the steroids will increase your blood sugar. If your blood sugar is greater than 400 have someone drive you to the nearest [address]. * If you experience any of the following problems, call the [facility] [phone] between 8:00 am - 4:30pm or After Hours [phone].     Shortness of breath    Fever of 101 F or higher    Nausea / Vomiting (not normal to you)    Increasing stiffness in the neck    Weakness or numbness in the arms or legs that is not resolving    Prolonged and increasing pain > than 4 days    ANYTHING OUT of the ORDINARY TO YOU    If YOU are experiencing a severe reaction / complication that you have never had before post procedure, call 911 or go to the nearest emergency room! All patients must have a  for transportation [place] regardless if you do or do not receive sedation. DISCHARGE SUMMARY from Nurse      PATIENT INSTRUCTIONS:    After Oral  or intravenous sedation, for 24 hours or while taking prescription Narcotics:  · Limit your activities  · Do not drive and operate hazardous machinery  · Do not make important personal or business decisions  · Do  not drink alcoholic beverages  · If you have not urinated within 8 hours after discharge, please contact your surgeon on call. Report the following to your surgeon:  · Excessive pain, swelling, redness or odor of or around the surgical area  · Temperature over 101  · Nausea and vomiting lasting longer than 4 hours or if unable to take medications  · Any signs of decreased circulation or nerve impairment to extremity: change in color, persistent  numbness, tingling, coldness or increase pain  · Any questions        What to do at Home:  Recommended activity: Activity as tolerated, NO DRIVING FOR 24 Hours post injection          *  Please give a list of your current medications to your Primary Care Provider. *  Please update this list whenever your medications are discontinued, doses are      changed, or new medications (including over-the-counter products) are added. *  Please carry medication information at all times in case of emergency situations. These are general instructions for a healthy lifestyle:    No smoking/ No tobacco products/ Avoid exposure to second hand smoke    Surgeon General's Warning:  Quitting smoking now greatly reduces serious risk to your health.     Obesity, smoking, and sedentary lifestyle greatly increases your risk for illness    A healthy diet, regular physical exercise & weight monitoring are important for maintaining a healthy lifestyle    You may be retaining fluid if you have a history of heart failure or if you experience any of the following symptoms:  Weight gain of 3 pounds or more overnight or 5 pounds in a week, increased swelling in our hands or feet or shortness of breath while lying flat in bed. Please call your doctor as soon as you notice any of these symptoms; do not wait until your next office visit. Recognize signs and symptoms of STROKE:    F-face looks uneven    A-arms unable to move or move unevenly    S-speech slurred or non-existent    T-time-call 911 as soon as signs and symptoms begin-DO NOT go       Back to bed or wait to see if you get better-TIME IS BRAIN.

## 2018-08-21 NOTE — PROCEDURES
[facility]    THERMOCOAGULATION OF LUMBAR MEDIAL BRANCH  PROCEDURE REPORT      PATIENT:  Attila Blue OF BIRTH:  1955  DATE OF SERVICE:  8/21/2018  [facility]    PRE-PROCEDURE DIAGNOSIS:  See Above    POST-PROCEDURE DIAGNOSIS:  See Above    PROCEDURE:      1. Right radiofrequency thermocoagulation of lumbar medial branch nerves,  L3/L4, L4/L5, L5/S1 (02415, 64636 x2)  2. Fluoroscopic needle guidance (spinal) (47414)  3. Supervision of moderate sedation (28047)  4. Additional 15 minutes of supervised moderate sedation (17464)    ANESTHESIA:  Local with moderate IV sedation. See Medication Administration Record for specific medications and dosage. COMPLICATIONS: None. PHYSICIAN:  Dana Malhotra MD    PRE-PROCEDURE NOTE:  Pre-procedural assessment of the patient was performed including a limited history and physical examination. The details of the procedure were discussed with the patient, including the risks, benefits and alternative options and an informed consent was obtained. The patients NPO status, if necessary for the specific procedure and/or administration of moderate intravenous sedation, if utilized, and availability of a responsible adult to escort the patient following the procedure were confirmed. A peripheral intravenous cannula was placed without difficulty and lactated Ringers solution administered. See nursing notes for details. PROCEDURE NOTE:  The patient was brought to the procedure suite and positioned on the fluoroscopy table in the prone position. Physiologic monitors were applied and supplemental oxygen was administered via nasal cannula. The skin was prepped in the standard surgical fashion and sterile drapes were applied over the procedure site.  Please refer to the Flowsheet for documentation of the patients vital signs and the Medication Administration Record for any oral and/or intravenous sedation administered prior to or during the procedure. 1% Lidocaine was utilized for local anesthesia. Under 10-15 degree ipsilateral oblique fluoroscopic guidance a 15cm 18gauge radiofrequency needle with a 10 mm curved active tip was advanced to the junction of the superior articular process and transverse process of each vertebral level immediately inferior to the above-mentioned dorsal rami medial branch nerves. Under AP fluoroscopic guidance, a similar needle was then placed over the superior margin of the sacral ala to thermocoagulate the L5 medial branch nerve. After each individual needle was placed, sensory and motor testing, at 50 Hz and 2 Hz, respectively, were performed which elicited ipsilateral deep local back discomfort without evidence of motor stimulation in the ipsilateral gluteal muscles or extremity. Following this, 1 mL of lidocaine 2% was injected after the negative aspiration of blood, air or CSF. Final correct needle placement was then confirmed by viewing each needle in AP and lateral fluoroscopic views in addition to repeat sensory and motor testing which, again, elicited ipsilateral deep local back discomfort without evidence of motor stimulation in the ipsilateral gluteal muscles or extremity. Medial branch nerve radiofrequency thermocoagulation was then performed at each level for 120 seconds at 80° centigrade x 1 cycle. Following this, 1/2ml to 1ml of a mixture of lidocaine 1% admixed with dexamethasone 5mg [10mg/ml] was injected through each radiofrequency needle after negative aspiration and before removing each needle. Then, all needles were removed intact. The area was thoroughly cleaned and sterile bandages applied as necessary. The patient tolerated the procedure well without complication and the vital signs remained stable throughout the procedure.     POST-PROCEDURE COURSE:   The patient was escorted from the procedure suite in satisfactory condition and recovered per facility protocol based on the type of procedure performed and/or the sedation utilized. The patient did not experience any adverse events and remained hemodynamically stable during the post-procedure period. DISCHARGE NOTE:  Upon discharge, the patient was able to tolerate fluids and was in no acute distress. The patient was oriented to person, place and time and vital signs were stable. Appropriate post-procedure instructions were provided and explained to the patient in detail and all questions were answered.                     Meir Aquino MD 8/21/2018 9:56 AM

## 2018-08-28 ENCOUNTER — TELEPHONE (OUTPATIENT)
Dept: PAIN MANAGEMENT | Age: 63
End: 2018-08-28

## 2018-09-11 ENCOUNTER — OFFICE VISIT (OUTPATIENT)
Dept: ORTHOPEDIC SURGERY | Age: 63
End: 2018-09-11

## 2018-09-11 VITALS
TEMPERATURE: 96.9 F | WEIGHT: 242 LBS | RESPIRATION RATE: 16 BRPM | HEIGHT: 73 IN | BODY MASS INDEX: 32.07 KG/M2 | HEART RATE: 71 BPM | OXYGEN SATURATION: 99 %

## 2018-09-11 DIAGNOSIS — M17.0 PRIMARY OSTEOARTHRITIS OF BOTH KNEES: Primary | ICD-10-CM

## 2018-09-11 RX ORDER — HYALURONATE SODIUM 10 MG/ML
2 SYRINGE (ML) INTRAARTICULAR ONCE
Qty: 2 ML | Refills: 0
Start: 2018-09-11 | End: 2018-09-11

## 2018-09-11 NOTE — PROGRESS NOTES
Patient: Rod Zimmerman                MRN: [id number]       SSN: [id number]  YOB: 1955        AGE: 70 y.o. SEX: male  Body mass index is 31.93 kg/(m^2). PCP: Ulises Coy MD  09/11/18    Chief Complaint   Patient presents with    Knee Pain     bilateral knee inj 1/3       HISTORY:  Rod Zimmerman is a 70 y.o. male who is seen for Bilateral knee and first Euflexxa injection. PROCEDURE:  Patient's Bilateral knee, after timeout under sterile conditions, was injected with 2 cc of Euflexxa. [facility]  OFFICE PROCEDURE PROGRESS NOTE        Chart reviewed for the following:   Radha PASTRANA PA-C, have reviewed the History, Physical and updated the Allergic reactions for [address] performed immediately prior to start of procedure:   Radha PASTRANA PA-C, have performed the following reviews on [place] prior to the start of the procedure:            * Patient was identified by name and date of birth   * Agreement on procedure being performed was verified  * Risks and Benefits explained to the patient  * Procedure site verified and marked as necessary  * Patient was positioned for comfort  * Consent was signed and verified     Time: 10:44 AM       Date of procedure: 9/11/2018    Procedure performed by:  Radha Alexandre PA-C    Provider assisted by: None     How tolerated by patient: tolerated the procedure well with no complications    Comments: none    IMPRESSION:     ICD-10-CM ICD-9-CM    1. Primary osteoarthritis of both knees M17.0 715.16 GA DRAIN/INJECT LARGE JOINT/BURSA      EUFLEXXA INJECTION PER DOSE      sodium hyaluronate (SUPARTZ FX/HYALGAN/GENIVSC) 10 mg/mL syrg injection        PLAN:  Mr. Nicolette Rich will return in one week for his second Euflexxa injection.     Scribed by Jayne Quarles (Karl Montague) as dictated by HEATHER Mensah PA-C  [facility]

## 2018-09-18 ENCOUNTER — OFFICE VISIT (OUTPATIENT)
Dept: ORTHOPEDIC SURGERY | Age: 63
End: 2018-09-18

## 2018-09-18 VITALS
DIASTOLIC BLOOD PRESSURE: 75 MMHG | WEIGHT: 242 LBS | HEART RATE: 72 BPM | OXYGEN SATURATION: 98 % | HEIGHT: 73 IN | BODY MASS INDEX: 32.07 KG/M2 | SYSTOLIC BLOOD PRESSURE: 119 MMHG

## 2018-09-18 DIAGNOSIS — M17.0 PRIMARY OSTEOARTHRITIS OF BOTH KNEES: Primary | ICD-10-CM

## 2018-09-18 RX ORDER — HYALURONATE SODIUM 10 MG/ML
2 SYRINGE (ML) INTRAARTICULAR ONCE
Qty: 2 ML | Refills: 0
Start: 2018-09-18 | End: 2018-09-18

## 2018-09-18 NOTE — PROGRESS NOTES
Patient: Greg Orona                MRN: [id number]       SSN: [id number]  YOB: 1955        AGE: 70 y.o. SEX: male  Body mass index is 31.93 kg/(m^2). PCP: Shannon Velázquez MD  09/18/18    Chief Complaint   Patient presents with    Knee Pain     bilateral       HISTORY:  Greg Orona is a 70 y.o. male who is seen for reevaluation of Bilateral knee and here for 2nd injection of Euflexxa. PROCEDURE:  Under ultrasound guidance, patient's Bilateral knee, after timeout under sterile conditions, was injected with 2 cc of Euflexxa. [facility]  OFFICE PROCEDURE PROGRESS NOTE        Chart reviewed for the following:   Claritza Olvera MD, have reviewed the History, Physical and updated the Allergic reactions for [address] performed immediately prior to start of procedure:   Claritza Olvera MD, have performed the following reviews on Greg Orona prior to the start of the procedure:            * Patient was identified by name and date of birth   * Agreement on procedure being performed was verified  * Risks and Benefits explained to the patient  * Procedure site verified and marked as necessary  * Patient was positioned for comfort  * Consent was signed and verified     Time: 10:19 AM    Date of procedure: 9/18/2018    Procedure performed by:  Dai Bishop MD    Provider assisted by: None     How tolerated by patient: tolerated the procedure well with no complications    Comments: none    IMPRESSION:     ICD-10-CM ICD-9-CM    1. Primary osteoarthritis of both knees M17.0 715.16 EUFLEXXA INJECTION PER DOSE      sodium hyaluronate (SUPARTZ FX/HYALGAN/GENIVSC) 10 mg/mL syrg injection      US GUIDE INJ/ASP/ARTHRO LG JNT/BURSA        PLAN:  Mr. Kasey May will return in one week for his third Euflexxa injection.       Scribed by Delonte Marley ([facility]) as dictated by MD ZEINA Hull Dr. Eugenia Sellers, confirm that all documentation is accurate.     Eugenia Sellers M.D.   [facility]

## 2018-09-27 ENCOUNTER — OFFICE VISIT (OUTPATIENT)
Dept: ORTHOPEDIC SURGERY | Age: 63
End: 2018-09-27

## 2018-09-27 VITALS
TEMPERATURE: 96.3 F | DIASTOLIC BLOOD PRESSURE: 75 MMHG | WEIGHT: 240.8 LBS | RESPIRATION RATE: 16 BRPM | BODY MASS INDEX: 31.91 KG/M2 | OXYGEN SATURATION: 96 % | SYSTOLIC BLOOD PRESSURE: 131 MMHG | HEART RATE: 71 BPM | HEIGHT: 73 IN

## 2018-09-27 DIAGNOSIS — M17.0 PRIMARY OSTEOARTHRITIS OF BOTH KNEES: Primary | ICD-10-CM

## 2018-09-27 RX ORDER — HYALURONATE SODIUM 10 MG/ML
2 SYRINGE (ML) INTRAARTICULAR ONCE
Qty: 2 ML | Refills: 0
Start: 2018-09-27 | End: 2018-09-27

## 2018-09-27 NOTE — PROGRESS NOTES
Patient: Issa Loza                MRN: [id number]       SSN: [id number] YOB: 1955        AGE: 70 y.o. SEX: male Body mass index is 31.77 kg/(m^2). PCP: Logan Christensen MD 
09/27/18 Chief Complaint Patient presents with  Knee Pain ISAK KNEE PAIN, F/U APPT. HISTORY:  Issa Loza is a 70 y.o. male who is seen for reevaluation of Bilateral knee and here for 3rd and final injection of Euflexxa. PROCEDURE:  Under ultrasound guidance, patient's Bilateral knee, after timeout under sterile conditions, was injected with 2 cc of Euflexxa. [address] [facility] 
OFFICE PROCEDURE PROGRESS NOTE Chart reviewed for the following: 
 Reshma Sanchez MD, have reviewed the History, Physical and updated the Allergic reactions for Issa Loza TIME OUT performed immediately prior to start of procedure: 
 Reshma Sanchez MD, have performed the following reviews on Issa Loza prior to the start of the procedure: 
         
* Patient was identified by name and date of birth * Agreement on procedure being performed was verified * Risks and Benefits explained to the patient * Procedure site verified and marked as necessary * Patient was positioned for comfort * Consent was signed and verified Time: 9:46 AM  
 
 
Date of procedure: 9/27/2018 Procedure performed by:  Kb Osborn MD 
 
Provider assisted by: None How tolerated by patient: tolerated the procedure well with no complications Comments: none IMPRESSION:  
  ICD-10-CM ICD-9-CM 1. Primary osteoarthritis of both knees M17.0 715.16 US GUIDE INJ/ASP/ARTHRO LG JNT/BURSA EUFLEXXA INJECTION PER DOSE  
   sodium hyaluronate (SUPARTZ FX/HYALGAN/GENIVSC) 10 mg/mL syrg injection PLAN: Mr. Juanis Goodwin has completed his Euflexxa injection series. he will return as needed. Scribed by Florida Chaney ([address] [place] [address] [address]) as dictated by MD ZEINA Green, Dr. Eugenia Sellers, confirm that all documentation is accurate.  
 
Eugenia Sellers M.D.  
[facility]

## 2019-07-02 ENCOUNTER — OFFICE VISIT (OUTPATIENT)
Dept: ORTHOPEDIC SURGERY | Age: 64
End: 2019-07-02

## 2019-07-02 VITALS
RESPIRATION RATE: 16 BRPM | TEMPERATURE: 96.8 F | DIASTOLIC BLOOD PRESSURE: 83 MMHG | BODY MASS INDEX: 30.35 KG/M2 | WEIGHT: 229 LBS | SYSTOLIC BLOOD PRESSURE: 127 MMHG | HEIGHT: 73 IN | OXYGEN SATURATION: 97 % | HEART RATE: 58 BPM

## 2019-07-02 DIAGNOSIS — M17.0 PRIMARY OSTEOARTHRITIS OF BOTH KNEES: Primary | ICD-10-CM

## 2019-07-02 RX ORDER — HYALURONATE SODIUM 10 MG/ML
2 SYRINGE (ML) INTRAARTICULAR ONCE
Qty: 2 ML | Refills: 0
Start: 2019-07-02 | End: 2019-07-02

## 2019-07-02 NOTE — PROGRESS NOTES
Patient: Tiera Kenyon                MRN: [id number]       SSN: [id number]  YOB: 1955        AGE: 75 y.o. SEX: male  Body mass index is 30.21 kg/m². PCP: Adelita Rosas MD  07/02/19    Chief Complaint   Patient presents with    Knee Pain     bilateral knee pain, f/u appt. HISTORY:  Tiera Kenyon is a 75 y.o. male who is seen for reevaluation of Bilateral knee and here for 1st injection of Euflexxa. PROCEDURE:  Under ultrasound guidance, patient's Bilateral knee, after timeout under sterile conditions, was injected with 2 cc of Euflexxa. [facility]  OFFICE PROCEDURE PROGRESS NOTE        Chart reviewed for the following:   Kindra Monroy MD, have reviewed the History, Physical and updated the Allergic reactions for [address] performed immediately prior to start of procedure:   Kindra Monroy MD, have performed the following reviews on Tiera Kenyon prior to the start of the procedure:            * Patient was identified by name and date of birth   * Agreement on procedure being performed was verified  * Risks and Benefits explained to the patient  * Procedure site verified and marked as necessary  * Patient was positioned for comfort  * Consent was signed and verified     Time: 10:54 AM       Date of procedure: 7/2/2019    Procedure performed by:  Elaine Carlos MD    Provider assisted by: None     How tolerated by patient: tolerated the procedure well with no complications    Comments: none    IMPRESSION:     ICD-10-CM ICD-9-CM    1. Primary osteoarthritis of both knees M17.0 715.16         PLAN:  Mr. Vivica Gaucher will return in one week for his second Euflexxa injection. Scribed by Patrice Naidu [address]) as dictated by Elaine Carlos MD    I, Dr. Elaine Carlos, confirm that all documentation is accurate.     Elaine Carlos M.D.   [facility] and Spine Specialist

## 2019-07-02 NOTE — PROGRESS NOTES
1. Have you been to the ER, urgent care clinic since your last visit? Hospitalized since your last visit? NO    2. Have you seen or consulted any other health care providers outside of the [address] [place] [address] since your last visit? Include any pap smears or colon screening.  NO

## 2019-07-09 ENCOUNTER — OFFICE VISIT (OUTPATIENT)
Dept: ORTHOPEDIC SURGERY | Age: 64
End: 2019-07-09

## 2019-07-09 VITALS
BODY MASS INDEX: 30.35 KG/M2 | HEIGHT: 73 IN | WEIGHT: 229 LBS | TEMPERATURE: 97.3 F | DIASTOLIC BLOOD PRESSURE: 70 MMHG | OXYGEN SATURATION: 98 % | HEART RATE: 73 BPM | SYSTOLIC BLOOD PRESSURE: 107 MMHG

## 2019-07-09 DIAGNOSIS — M17.0 PRIMARY OSTEOARTHRITIS OF BOTH KNEES: Primary | ICD-10-CM

## 2019-07-09 RX ORDER — HYALURONATE SODIUM 10 MG/ML
2 SYRINGE (ML) INTRAARTICULAR ONCE
Qty: 2 ML | Refills: 0
Start: 2019-07-09 | End: 2019-07-09

## 2019-07-09 NOTE — PROGRESS NOTES
Patient: Maco Santos                MRN: [id number]       SSN: [id number]  YOB: 1955        AGE: 75 y.o. SEX: male  Body mass index is 30.21 kg/m². PCP: Nikkie Neumann MD  07/09/19    Chief Complaint   Patient presents with    Knee Pain     bilateral, 2/3 Euflexxa injection       HISTORY:  Maco Santos is a 75 y.o. male who is seen for reevaluation of Bilateral knee and here for 2nd injection of Euflexxa. PROCEDURE:  Under ultrasound guidance, patient's Bilateral knee, after timeout under sterile conditions, was injected with 2 cc of Euflexxa. [facility]  OFFICE PROCEDURE PROGRESS NOTE        Chart reviewed for the following:   Mita Parker MD, have reviewed the History, Physical and updated the Allergic reactions for [address] performed immediately prior to start of procedure:   Mita Parker MD, have performed the following reviews on Maco Santos prior to the start of the procedure:            * Patient was identified by name and date of birth   * Agreement on procedure being performed was verified  * Risks and Benefits explained to the patient  * Procedure site verified and marked as necessary  * Patient was positioned for comfort  * Consent was signed and verified     Time: 10:45 AM    Date of procedure: 7/9/2019    Procedure performed by:  Alvina Cannon MD    Provider assisted by: None     How tolerated by patient: tolerated the procedure well with no complications    Comments: none    IMPRESSION:     ICD-10-CM ICD-9-CM    1. Primary osteoarthritis of both knees M17.0 715.16         PLAN:  Mr. Artur Mejia will return in one week for his third Euflexxa injection. Scribed by Clara Tavares) as dictated by Alvina Cannon MD    I, Dr. Alvina Cannon, confirm that all documentation is accurate.     Alvina Cannon M.D.   [facility]

## 2019-07-16 ENCOUNTER — OFFICE VISIT (OUTPATIENT)
Dept: ORTHOPEDIC SURGERY | Age: 64
End: 2019-07-16

## 2019-07-16 VITALS
OXYGEN SATURATION: 100 % | SYSTOLIC BLOOD PRESSURE: 110 MMHG | BODY MASS INDEX: 30.48 KG/M2 | TEMPERATURE: 97.4 F | DIASTOLIC BLOOD PRESSURE: 77 MMHG | HEART RATE: 65 BPM | HEIGHT: 72 IN | WEIGHT: 225 LBS

## 2019-07-16 DIAGNOSIS — M17.0 PRIMARY OSTEOARTHRITIS OF BOTH KNEES: Primary | ICD-10-CM

## 2019-07-16 RX ORDER — HYALURONATE SODIUM 10 MG/ML
2 SYRINGE (ML) INTRAARTICULAR ONCE
Qty: 2 ML | Refills: 0
Start: 2019-07-16 | End: 2019-07-16

## 2019-07-16 NOTE — PROGRESS NOTES
Patient: Justine Varner                MRN: [id number]       SSN: [id number]  YOB: 1955        AGE: 75 y.o. SEX: male  There is no height or weight on file to calculate BMI. PCP: Miranda Art MD  07/16/19    No chief complaint on file. HISTORY:  Justine Varner is a 75 y.o. male who is seen for reevaluation of Bilateral knee and here for 3rd and final injection of Euflexxa. PROCEDURE:  Under ultrasound guidance, patient's Bilateral knee, after timeout under sterile conditions, was injected with 2 cc of Euflexxa. [facility]  OFFICE PROCEDURE PROGRESS NOTE        Chart reviewed for the following:   Lorri Abdalla MD, have reviewed the History, Physical and updated the Allergic reactions for [address] performed immediately prior to start of procedure:   Lorri Abdalla MD, have performed the following reviews on Justine Varner prior to the start of the procedure:            * Patient was identified by name and date of birth   * Agreement on procedure being performed was verified  * Risks and Benefits explained to the patient  * Procedure site verified and marked as necessary  * Patient was positioned for comfort  * Consent was signed and verified     Time: 9:39 AM       Date of procedure: 7/16/2019    Procedure performed by:  Di Doshi MD    Provider assisted by: None     How tolerated by patient: tolerated the procedure well with no complications    Comments: none    IMPRESSION:     ICD-10-CM ICD-9-CM    1. Primary osteoarthritis of both knees M17.0 715.16         PLAN: Mr. Debby Acosta has completed his Euflexxa injection series. he will return as needed. Scribed by Lenor Gosselin [address]) as dictated by Di Doshi MD    I, Dr. Di Doshi, confirm that all documentation is accurate.     Di Doshi M.D.   [facility]

## 2020-05-12 ENCOUNTER — TELEPHONE (OUTPATIENT)
Dept: ORTHOPEDIC SURGERY | Age: 65
End: 2020-05-12

## 2020-05-12 DIAGNOSIS — M17.0 PRIMARY OSTEOARTHRITIS OF BOTH KNEES: Primary | ICD-10-CM

## 2020-05-12 NOTE — TELEPHONE ENCOUNTER
Patient called asking for an order for his Euflexxa injections in both knees.  Please advise patient at [phone]

## 2020-06-15 ENCOUNTER — DOCUMENTATION ONLY (OUTPATIENT)
Dept: ORTHOPEDIC SURGERY | Age: 65
End: 2020-06-15

## 2020-06-16 ENCOUNTER — OFFICE VISIT (OUTPATIENT)
Dept: ORTHOPEDIC SURGERY | Age: 65
End: 2020-06-16

## 2020-06-16 VITALS
TEMPERATURE: 96.6 F | RESPIRATION RATE: 16 BRPM | BODY MASS INDEX: 28.85 KG/M2 | HEIGHT: 72 IN | HEART RATE: 62 BPM | OXYGEN SATURATION: 99 % | WEIGHT: 213 LBS | DIASTOLIC BLOOD PRESSURE: 73 MMHG | SYSTOLIC BLOOD PRESSURE: 110 MMHG

## 2020-06-16 DIAGNOSIS — M17.0 PRIMARY OSTEOARTHRITIS OF BOTH KNEES: Primary | ICD-10-CM

## 2020-06-16 RX ORDER — HYALURONATE SODIUM 10 MG/ML
2 SYRINGE (ML) INTRAARTICULAR ONCE
Qty: 2 ML | Refills: 0
Start: 2020-06-16 | End: 2020-06-16

## 2020-06-16 NOTE — PROGRESS NOTES
Patient: Ela Turner                MRN: [id number]       SSN: [id number]  YOB: 1955        AGE: 77 y.o. SEX: male  Body mass index is 28.89 kg/m². PCP: Rebekah Ohara MD  06/16/20    Chief Complaint   Patient presents with    Knee Pain     Manolo knee pain       HISTORY:  Ela Turner is a 77 y.o. male who is seen for reevaluation of Bilateral knee and here for 1st injection of Euflexxa. PROCEDURE:  Under ultrasound guidance, patient's Bilateral knee, after timeout under sterile conditions, was injected with 2 cc of Euflexxa. [facility]  OFFICE PROCEDURE PROGRESS NOTE        Chart reviewed for the following:   Oliverio Ortiz MD, have reviewed the History, Physical and updated the Allergic reactions for [address] performed immediately prior to start of procedure:   Oliverio Ortiz MD, have performed the following reviews on Ela Turner prior to the start of the procedure:            * Patient was identified by name and date of birth   * Agreement on procedure being performed was verified  * Risks and Benefits explained to the patient  * Procedure site verified and marked as necessary  * Patient was positioned for comfort  * Consent was signed and verified     Time: 8:04 AM       Date of procedure: 6/16/2020    Procedure performed by:  Dayna Almaguer MD    Provider assisted by: None     How tolerated by patient: tolerated the procedure well with no complications    Comments: none    IMPRESSION:     ICD-10-CM ICD-9-CM    1. Primary osteoarthritis of both knees M17.0 715.16 EUFLEXXA INJECTION PER DOSE      sodium hyaluronate (SUPARTZ FX/HYALGAN/GENIVSC) 10 mg/mL syrg injection      US GUIDE INJ/ASP/ARTHRO LG JNT/BURSA        PLAN:  Mr. Alan Corado will return in one week for his second Euflexxa injection.       Scribed by Kayden Remy [address]) as dictated by Dayna Almaguer MD PASTRANA, Dr. Ani Aaron, confirm that all documentation is accurate.     Ani Aaron M.D.   [facility]

## 2020-06-23 ENCOUNTER — OFFICE VISIT (OUTPATIENT)
Dept: ORTHOPEDIC SURGERY | Age: 65
End: 2020-06-23

## 2020-06-23 VITALS
BODY MASS INDEX: 28.61 KG/M2 | TEMPERATURE: 96.6 F | HEART RATE: 63 BPM | HEIGHT: 72 IN | RESPIRATION RATE: 16 BRPM | SYSTOLIC BLOOD PRESSURE: 112 MMHG | WEIGHT: 211.2 LBS | OXYGEN SATURATION: 99 % | DIASTOLIC BLOOD PRESSURE: 69 MMHG

## 2020-06-23 DIAGNOSIS — M17.0 PRIMARY OSTEOARTHRITIS OF BOTH KNEES: Primary | ICD-10-CM

## 2020-06-23 RX ORDER — HYALURONATE SODIUM 10 MG/ML
2 SYRINGE (ML) INTRAARTICULAR ONCE
Qty: 2 ML | Refills: 0
Start: 2020-06-23 | End: 2020-06-23

## 2020-06-23 NOTE — PROGRESS NOTES
1. Have you been to the ER, urgent care clinic since your last visit? Hospitalized since your last visit? No    2. Have you seen or consulted any other health care providers outside of the [address] [place] [address] since your last visit? Include any pap smears or colon screening.  No

## 2020-06-23 NOTE — PROGRESS NOTES
Patient: Fernando Schwartz                MRN: [id number]       SSN: [id number]  YOB: 1955        AGE: 77 y.o. SEX: male  Body mass index is 28.64 kg/m². PCP: Micky Crawford MD  06/23/20    Chief Complaint   Patient presents with    Knee Pain     Manolo knee pain       HISTORY:  Fernando Schwartz is a 77 y.o. male who is seen for reevaluation of Bilateral knee and here for 2nd injection of Euflexxa. PROCEDURE:  Under ultrasound guidance, patient's Bilateral knee, after timeout under sterile conditions, was injected with 2 cc of Euflexxa. [facility]  OFFICE PROCEDURE PROGRESS NOTE        Chart reviewed for the following:   Jone Rajput MD, have reviewed the History, Physical and updated the Allergic reactions for [address] performed immediately prior to start of procedure:   Jone Rajput MD, have performed the following reviews on Fernando Schwartz prior to the start of the procedure:            * Patient was identified by name and date of birth   * Agreement on procedure being performed was verified  * Risks and Benefits explained to the patient  * Procedure site verified and marked as necessary  * Patient was positioned for comfort  * Consent was signed and verified     Time: 7:56 AM    Date of procedure: 6/23/2020    Procedure performed by:  Brittney Maguire MD    Provider assisted by: None     How tolerated by patient: tolerated the procedure well with no complications    Comments: none    IMPRESSION:     ICD-10-CM ICD-9-CM    1. Primary osteoarthritis of both knees M17.0 715.16 EUFLEXXA INJECTION PER DOSE      sodium hyaluronate (SUPARTZ FX/HYALGAN/GENIVSC) 10 mg/mL syrg injection      US GUIDE INJ/ASP/ARTHRO LG JNT/BURSA        PLAN:  Mr. Ernie Grant will return in one week for his third Euflexxa injection.       Scribed by Sweta Mccabe) as dictated by Brittney Maguire MD    I, Dr. Cassandra Underwood, confirm that all documentation is accurate.     Cassandra Underwood M.D.   [facility]

## 2020-06-30 ENCOUNTER — OFFICE VISIT (OUTPATIENT)
Dept: ORTHOPEDIC SURGERY | Age: 65
End: 2020-06-30

## 2020-06-30 VITALS
RESPIRATION RATE: 15 BRPM | DIASTOLIC BLOOD PRESSURE: 61 MMHG | HEIGHT: 72 IN | TEMPERATURE: 97.3 F | BODY MASS INDEX: 28.66 KG/M2 | HEART RATE: 74 BPM | WEIGHT: 211.6 LBS | OXYGEN SATURATION: 97 % | SYSTOLIC BLOOD PRESSURE: 109 MMHG

## 2020-06-30 DIAGNOSIS — M17.0 PRIMARY OSTEOARTHRITIS OF BOTH KNEES: Primary | ICD-10-CM

## 2020-06-30 RX ORDER — HYALURONATE SODIUM 10 MG/ML
2 SYRINGE (ML) INTRAARTICULAR ONCE
Qty: 2 ML | Refills: 0
Start: 2020-06-30 | End: 2020-06-30

## 2020-06-30 NOTE — PROGRESS NOTES
Patient: Sherwin Morillo                MRN: [id number]       SSN: [id number]  YOB: 1955        AGE: 77 y.o. SEX: male  Body mass index is 28.7 kg/m². PCP: Sean Tapia MD  06/30/20    Chief Complaint   Patient presents with    Knee Pain     shant knee pain       HISTORY:  Sherwin Morillo is a 77 y.o. male who is seen for reevaluation of Bilateral knee and here for 3rd and final injection of Euflexxa. PROCEDURE:  Under ultrasound guidance, patient's Bilateral knee, after timeout under sterile conditions, was injected with 2 cc of Euflexxa. [facility]  OFFICE PROCEDURE PROGRESS NOTE        Chart reviewed for the following:   Audrey Fortune MD, have reviewed the History, Physical and updated the Allergic reactions for [address] performed immediately prior to start of procedure:   Audrey Fortune MD, have performed the following reviews on Sherwin Morillo prior to the start of the procedure:            * Patient was identified by name and date of birth   * Agreement on procedure being performed was verified  * Risks and Benefits explained to the patient  * Procedure site verified and marked as necessary  * Patient was positioned for comfort  * Consent was signed and verified     Time: 8:45 AM       Date of procedure: 6/30/2020    Procedure performed by:  Vona Pallas, MD    Provider assisted by: None     How tolerated by patient: tolerated the procedure well with no complications    Comments: none    IMPRESSION:     ICD-10-CM ICD-9-CM    1. Primary osteoarthritis of both knees M17.0 715.16 EUFLEXXA INJECTION PER DOSE      sodium hyaluronate (SUPARTZ FX/HYALGAN/GENIVSC) 10 mg/mL syrg injection      US GUIDE INJ/ASP/ARTHRO LG JNT/BURSA        PLAN: Mr. aFvio Welch has completed his Euflexxa injection series. he will return as needed.       Scribed by Alice Hartman Saliva) as dictated by Adi Patel Re Nichols MD    I, Dr. Cassandra Udnerwood, confirm that all documentation is accurate.     Cassandra Underwood M.D.   [facility]

## 2021-02-11 DIAGNOSIS — M17.0 PRIMARY OSTEOARTHRITIS OF BOTH KNEES: ICD-10-CM

## 2021-02-16 ENCOUNTER — DOCUMENTATION ONLY (OUTPATIENT)
Dept: ORTHOPEDIC SURGERY | Age: 66
End: 2021-02-16

## 2021-02-22 NOTE — PROGRESS NOTES
Patient: Evonne Fuller                MRN: [id number]       SSN: [id number]  YOB: 1955        AGE: 77 y.o. SEX: male  Body mass index is 30.41 kg/m². PCP: Robb Goddard MD  02/23/21    Chief Complaint   Patient presents with    Knee Pain     1 # injection in Bilateral knees       HISTORY:  Evonne Fuller is a 77 y.o. male who is seen for reevaluation of Bilateral knee and here for 1st injection of Euflexxa. PROCEDURE:  Under ultrasound guidance, patient's Bilateral knee, after timeout under sterile conditions, was injected with 2 cc of Euflexxa. Intra-articular. Ultrasound images captured using [facility] [employer] Loop Ultrasound machine using a frequency of 10 MHz with a linear transducer and scanned into patient's chart. [facility]  OFFICE PROCEDURE PROGRESS NOTE        Chart reviewed for the following:   Yonny Wilson MD, have reviewed the History, Physical and updated the Allergic reactions for [address] performed immediately prior to start of procedure:   Yonny Wilson MD, have performed the following reviews on Evonne Fuller prior to the start of the procedure:            * Patient was identified by name and date of birth   * Agreement on procedure being performed was verified  * Risks and Benefits explained to the patient  * Procedure site verified and marked as necessary  * Patient was positioned for comfort  * Needle placement confirmed by ultrasound  * Consent was signed and verified     Time: 1:49 PM       Date of procedure: 2/23/2021    Procedure performed by:  Derek Tejeda MD    Provider assisted by: None     How tolerated by patient: tolerated the procedure well with no complications    Comments: none    IMPRESSION:     ICD-10-CM ICD-9-CM    1.  Primary osteoarthritis of both knees  M17.0 715.16 sodium hyaluronate (SUPARTZ FX/EUFLEXXA/HYALGAN) 10 mg/mL injection syrg 20 mg ARTHROCENTESIS ASPIR&/INJ MAJOR JT/BURSA W/US        PLAN:  Mr. Favio Welch will return in one week for his second Euflexxa injection. Scribed by Alice Stone [address] [facility] [address]) as dictated by Vona Pallas, MD    I, Dr. Vona Pallas, confirm that all documentation is accurate.     Vona Pallas, M.D.   [facility]

## 2021-02-23 ENCOUNTER — OFFICE VISIT (OUTPATIENT)
Dept: ORTHOPEDIC SURGERY | Age: 66
End: 2021-02-23
Payer: MEDICARE

## 2021-02-23 VITALS
SYSTOLIC BLOOD PRESSURE: 124 MMHG | HEIGHT: 72 IN | HEART RATE: 65 BPM | TEMPERATURE: 96.9 F | BODY MASS INDEX: 30.37 KG/M2 | RESPIRATION RATE: 16 BRPM | OXYGEN SATURATION: 95 % | WEIGHT: 224.2 LBS | DIASTOLIC BLOOD PRESSURE: 74 MMHG

## 2021-02-23 DIAGNOSIS — M17.0 PRIMARY OSTEOARTHRITIS OF BOTH KNEES: Primary | ICD-10-CM

## 2021-02-23 PROCEDURE — 20611 DRAIN/INJ JOINT/BURSA W/US: CPT | Performed by: ORTHOPAEDIC SURGERY

## 2021-03-02 ENCOUNTER — OFFICE VISIT (OUTPATIENT)
Dept: ORTHOPEDIC SURGERY | Age: 66
End: 2021-03-02
Payer: MEDICARE

## 2021-03-02 VITALS
TEMPERATURE: 96.9 F | HEART RATE: 65 BPM | BODY MASS INDEX: 30.2 KG/M2 | RESPIRATION RATE: 18 BRPM | HEIGHT: 72 IN | WEIGHT: 223 LBS | OXYGEN SATURATION: 100 %

## 2021-03-02 DIAGNOSIS — M17.0 PRIMARY OSTEOARTHRITIS OF BOTH KNEES: Primary | ICD-10-CM

## 2021-03-02 PROCEDURE — 20611 DRAIN/INJ JOINT/BURSA W/US: CPT | Performed by: ORTHOPAEDIC SURGERY

## 2021-03-02 NOTE — PROGRESS NOTES
Patient: Nevaeh Montiel                MRN: [id number]       SSN: [id number]  YOB: 1955        AGE: 74 y.o. SEX: male  Body mass index is 30.24 kg/m². PCP: Angeli Dang MD  03/02/21    Chief Complaint   Patient presents with    Knee Pain     bilateral       HISTORY:  Nevaeh Montiel is a 74 y.o. male who is seen for reevaluation of Bilateral knee and here for 2nd injection of Euflexxa. PROCEDURE:  Under ultrasound guidance, patient's Bilateral knee, after timeout under sterile conditions, was injected with 2 cc of Euflexxa. Intra-articular. Ultrasound images captured using [address] Ultrasound machine using a frequency of 10 MHz with a linear transducer and scanned into patient's chart. [facility]  OFFICE PROCEDURE PROGRESS NOTE        Chart reviewed for the following:   Rohini Reyes MD, have reviewed the History, Physical and updated the Allergic reactions for [address] performed immediately prior to start of procedure:   Rohini Reyes MD, have performed the following reviews on Nevaeh Montiel prior to the start of the procedure:            * Patient was identified by name and date of birth   * Agreement on procedure being performed was verified  * Risks and Benefits explained to the patient  * Procedure site verified and marked as necessary  * Patient was positioned for comfort  * Needle placement confirmed by ultrasound  * Consent was signed and verified     Time: 7:51 AM    Date of procedure: 3/2/2021    Procedure performed by:  Juan Wong MD    Provider assisted by: None     How tolerated by patient: tolerated the procedure well with no complications    Comments: none    IMPRESSION:     ICD-10-CM ICD-9-CM    1.  Primary osteoarthritis of both knees  M17.0 715.16 sodium hyaluronate (SUPARTZ FX/EUFLEXXA/HYALGAN) 10 mg/mL injection syrg 20 mg      ARTHROCENTESIS ASPIR&/INJ MAJOR JT/BURSA W/US        PLAN:  Mr. Sam Medrano will return in one week for his third Euflexxa injection. Scribed by Ambrose Kanner [address]) as dictated by MD ZEINA Ng, Dr. Shadi Hanson, confirm that all documentation is accurate.     Shadi Hanson M.D.   [facility]

## 2021-03-09 ENCOUNTER — OFFICE VISIT (OUTPATIENT)
Dept: ORTHOPEDIC SURGERY | Age: 66
End: 2021-03-09
Payer: MEDICARE

## 2021-03-09 VITALS
WEIGHT: 224 LBS | DIASTOLIC BLOOD PRESSURE: 74 MMHG | RESPIRATION RATE: 14 BRPM | HEIGHT: 72 IN | TEMPERATURE: 97.1 F | HEART RATE: 65 BPM | OXYGEN SATURATION: 100 % | BODY MASS INDEX: 30.34 KG/M2 | SYSTOLIC BLOOD PRESSURE: 119 MMHG

## 2021-03-09 DIAGNOSIS — M17.0 PRIMARY OSTEOARTHRITIS OF BOTH KNEES: Primary | ICD-10-CM

## 2021-03-09 PROCEDURE — 20611 DRAIN/INJ JOINT/BURSA W/US: CPT | Performed by: ORTHOPAEDIC SURGERY

## 2021-03-09 NOTE — PROGRESS NOTES
Patient: Erum Nixon                MRN: [id number]       SSN: [id number]  YOB: 1955        AGE: 74 y.o. SEX: male  Body mass index is 30.38 kg/m². PCP: Jennifer Parmar MD  03/09/21    Chief Complaint   Patient presents with    Knee Pain     Both knee       HISTORY:  Erum Nixon is a 74 y.o. male who is seen for reevaluation of Bilateral knee and here for 3rd and final injection of Euflexxa. PROCEDURE:  Under ultrasound guidance, patient's Bilateral knee, after timeout under sterile conditions, was injected with 2 cc of Euflexxa. Intra-articular. Ultrasound images captured using [employer] Loop Ultrasound machine using a frequency of 10 MHz with a linear transducer and scanned into patient's chart. [facility]  OFFICE PROCEDURE PROGRESS NOTE        Chart reviewed for the following:   Mely Valentin MD, have reviewed the History, Physical and updated the Allergic reactions for [address] performed immediately prior to start of procedure:   Mely Valentin MD, have performed the following reviews on Erum Nixon prior to the start of the procedure:            * Patient was identified by name and date of birth   * Agreement on procedure being performed was verified  * Risks and Benefits explained to the patient  * Procedure site verified and marked as necessary  * Patient was positioned for comfort  * Needle placement confirmed by ultrasound  * Consent was signed and verified     Time: 7:51 AM       Date of procedure: 3/9/2021    Procedure performed by:  Gerardo Desir MD    Provider assisted by: None     How tolerated by patient: tolerated the procedure well with no complications    Comments: none    IMPRESSION:     ICD-10-CM ICD-9-CM    1.  Primary osteoarthritis of both knees  M17.0 715.16 sodium hyaluronate (SUPARTZ FX/EUFLEXXA/HYALGAN) 10 mg/mL injection syrg 20 mg ARTHROCENTESIS ASPIR&/INJ MAJOR JT/BURSA W/US        PLAN: Mr. Willim Bloch has completed his Euflexxa injection series. he will return as needed. Scribed by Dora Cardenas [address]) as dictated by Safia Tomas MD    I, Dr. Safia Tomas, confirm that all documentation is accurate.     Safia Tomas M.D.   [facility]

## 2021-11-12 ENCOUNTER — DOCUMENTATION ONLY (OUTPATIENT)
Dept: ORTHOPEDIC SURGERY | Age: 66
End: 2021-11-12

## 2021-12-06 ENCOUNTER — OFFICE VISIT (OUTPATIENT)
Dept: ORTHOPEDIC SURGERY | Age: 66
End: 2021-12-06
Payer: MEDICARE

## 2021-12-06 VITALS — OXYGEN SATURATION: 97 % | WEIGHT: 227.8 LBS | HEART RATE: 76 BPM | TEMPERATURE: 98 F | BODY MASS INDEX: 30.9 KG/M2

## 2021-12-06 DIAGNOSIS — M17.0 PRIMARY OSTEOARTHRITIS OF BOTH KNEES: Primary | ICD-10-CM

## 2021-12-06 PROCEDURE — 20611 DRAIN/INJ JOINT/BURSA W/US: CPT | Performed by: ORTHOPAEDIC SURGERY

## 2021-12-06 NOTE — PROGRESS NOTES
Patient: Mayra Saul                MRN: [id number]       SSN: [id number]  YOB: 1955        AGE: 74 y.o. SEX: male  Body mass index is 30.9 kg/m². PCP: Mack aCrdoza MD  12/06/21    Chief Complaint   Patient presents with    Knee Pain     bilat        HISTORY:  Mayra Saul is a 74 y.o. male who is seen for reevaluation of Bilateral knee and here for 1st injection of Euflexxa. PROCEDURE:  Under ultrasound guidance, patient's Bilateral knee, after timeout under sterile conditions, was injected with 2 cc of Euflexxa. Intra-articular. Ultrasound images captured using [facility] Ultrasound machine using a frequency of 10 MHz with a linear transducer and scanned into patient's chart. [facility] - [facility]  OFFICE PROCEDURE PROGRESS NOTE        Chart reviewed for the following:   Ruthie De La Cruz MD, have reviewed the History, Physical and updated the Allergic reactions for [address] [place] [address] performed immediately prior to start of procedure:   Ruthie De La Cruz MD, have performed the following reviews on Mayra Saul prior to the start of the procedure:            * Patient was identified by name and date of birth   * Agreement on procedure being performed was verified  * Risks and Benefits explained to the patient  * Procedure site verified and marked as necessary  * Patient was positioned for comfort  * Needle placement confirmed by ultrasound  * Consent was signed and verified     Time: 4:14 PM       Date of procedure: 12/6/2021    Procedure performed by:  Kathrine Soto MD    Provider assisted by: None     How tolerated by patient: tolerated the procedure well with no complications    Comments: none    IMPRESSION:     ICD-10-CM ICD-9-CM    1. Primary osteoarthritis of both knees  M17.0 715.16         PLAN:  Mr. Coral Blair will return in one week for his second Euflexxa injection.       Scribed by Serjennifer Criss Muller ([facility]) as dictated by Cassandra Underwood MD    I, Dr. Cassandra Underwood, confirm that all documentation is accurate.     Cassandra Underwood M.D.   [facility] Specialist

## 2021-12-13 ENCOUNTER — OFFICE VISIT (OUTPATIENT)
Dept: ORTHOPEDIC SURGERY | Age: 66
End: 2021-12-13
Payer: MEDICARE

## 2021-12-13 VITALS — HEIGHT: 72 IN | WEIGHT: 220 LBS | TEMPERATURE: 97.3 F | BODY MASS INDEX: 29.8 KG/M2

## 2021-12-13 DIAGNOSIS — M17.0 PRIMARY OSTEOARTHRITIS OF BOTH KNEES: Primary | ICD-10-CM

## 2021-12-13 PROCEDURE — 20611 DRAIN/INJ JOINT/BURSA W/US: CPT | Performed by: ORTHOPAEDIC SURGERY

## 2021-12-13 NOTE — PROGRESS NOTES
Patient: Luis Enrique Vu                MRN: [id number]       SSN: [id number]  YOB: 1955        AGE: 74 y.o. SEX: male  There is no height or weight on file to calculate BMI. PCP: Sabrina Patel MD  12/13/21    Chief Complaint   Patient presents with    Knee Pain     BILAT       HISTORY:  Luis Enrique Vu is a 74 y.o. male who is seen for reevaluation of Bilateral knee and here for 2nd injection of Euflexxa. PROCEDURE:  Under ultrasound guidance, patient's Bilateral knee, after timeout under sterile conditions, was injected with 2 cc of Euflexxa. Intra-articular. Ultrasound images captured using [employer] Loop Ultrasound machine using a frequency of 10 MHz with a linear transducer and scanned into patient's chart. [facility] - [facility]  OFFICE PROCEDURE PROGRESS NOTE        Chart reviewed for the following:   Malcolm Haywood MD, have reviewed the History, Physical and updated the Allergic reactions for [address] performed immediately prior to start of procedure:   Malcolm Haywood MD, have performed the following reviews on Luis Enrique Vu prior to the start of the procedure:            * Patient was identified by name and date of birth   * Agreement on procedure being performed was verified  * Risks and Benefits explained to the patient  * Procedure site verified and marked as necessary  * Patient was positioned for comfort  * Needle placement confirmed by ultrasound  * Consent was signed and verified     Time: 1:22 PM    Date of procedure: 12/13/2021    Procedure performed by:  Ani Aaron MD    Provider assisted by: None     How tolerated by patient: tolerated the procedure well with no complications    Comments: none    IMPRESSION:     ICD-10-CM ICD-9-CM    1.  Primary osteoarthritis of both knees  M17.0 715.16         PLAN:  Mr. Narinder Archuleta will return in one week for his third Euflexxa injection. Scribed by Dorothy Morillo (Lore Johnson) as dictated by MD ZEINA Brink, Dr. Micheal Yang, confirm that all documentation is accurate.     Micheal Yang M.D.   [facility]

## 2021-12-20 ENCOUNTER — OFFICE VISIT (OUTPATIENT)
Dept: ORTHOPEDIC SURGERY | Age: 66
End: 2021-12-20
Payer: MEDICARE

## 2021-12-20 VITALS — OXYGEN SATURATION: 98 % | TEMPERATURE: 97.6 F | WEIGHT: 226.2 LBS | BODY MASS INDEX: 30.68 KG/M2 | HEART RATE: 64 BPM

## 2021-12-20 DIAGNOSIS — M17.0 PRIMARY OSTEOARTHRITIS OF BOTH KNEES: Primary | ICD-10-CM

## 2021-12-20 PROCEDURE — 20611 DRAIN/INJ JOINT/BURSA W/US: CPT | Performed by: ORTHOPAEDIC SURGERY

## 2021-12-20 NOTE — PROGRESS NOTES
Patient: Betzy Mei                MRN: [id number]       SSN: [id number]  YOB: 1955        AGE: 74 y.o. SEX: male  Body mass index is 30.68 kg/m². PCP: Magdaleno Jacobsen MD  12/20/21    Chief Complaint   Patient presents with    Knee Pain     BILAT       HISTORY:  Betzy Mei is a 74 y.o. male who is seen for reevaluation of Bilateral knee and here for 3rd and final injection of Euflexxa. PROCEDURE:  Under ultrasound guidance, patient's Bilateral knee, after timeout under sterile conditions, was injected with 2 cc of Euflexxa. Intra-articular. Ultrasound images captured using [email or web address] [address] Ultrasound machine using a frequency of 10 MHz with a linear transducer and scanned into patient's chart. [facility] - [facility]  OFFICE PROCEDURE PROGRESS NOTE        Chart reviewed for the following:   Guanakito Castillo MD, have reviewed the History, Physical and updated the Allergic reactions for [address] performed immediately prior to start of procedure:   Guanakito Castillo MD, have performed the following reviews on Betzy Mei prior to the start of the procedure:            * Patient was identified by name and date of birth   * Agreement on procedure being performed was verified  * Risks and Benefits explained to the patient  * Procedure site verified and marked as necessary  * Patient was positioned for comfort  * Needle placement confirmed by ultrasound  * Consent was signed and verified     Time: 12:56 PM       Date of procedure: 12/20/2021    Procedure performed by:  Link Tobar MD    Provider assisted by: None     How tolerated by patient: tolerated the procedure well with no complications    Comments: none    IMPRESSION:     ICD-10-CM ICD-9-CM    1. Primary osteoarthritis of both knees  M17.0 715.16         PLAN: Mr. Arpita Wallace has completed his Euflexxa injection series.  he will return as needed. Scribed by Sulma High (Chelita Yeager) as dictated by Eliseo Peña MD    I, Dr. Eliseo Peña, confirm that all documentation is accurate.     Eliseo Peña M.D.   [facility]

## 2022-03-19 PROBLEM — G89.4 CHRONIC PAIN SYNDROME: Status: ACTIVE | Noted: 2017-08-03

## 2022-05-24 ENCOUNTER — TELEPHONE (OUTPATIENT)
Dept: ORTHOPEDIC SURGERY | Age: 67
End: 2022-05-24

## 2022-05-24 DIAGNOSIS — M17.0 PRIMARY OSTEOARTHRITIS OF BOTH KNEES: Primary | ICD-10-CM

## 2022-05-24 NOTE — LETTER
5/24/2022 4:45 PM    Mr. Arreguin [place] [phone]      Please obtain authorization for above patient for bilateral  knee viscosupplementation.          Sincerely,      Marie Ortiz MD

## 2022-05-24 NOTE — TELEPHONE ENCOUNTER
Patient is requesting a new order for another series of Euflexxa for his bilateral knees. He finished his last series on 12/20/21. Please advise.      Patient [phone]

## 2022-06-03 NOTE — TELEPHONE ENCOUNTER
Patient called to inquire about inj series, per coordinator, last series ended 12/20/2021, pt ok to begin new Euflexxa series after 6/27/2022.

## 2022-06-29 ENCOUNTER — OFFICE VISIT (OUTPATIENT)
Dept: ORTHOPEDIC SURGERY | Age: 67
End: 2022-06-29
Payer: MEDICARE

## 2022-06-29 VITALS — BODY MASS INDEX: 29.95 KG/M2 | HEIGHT: 73 IN | WEIGHT: 226 LBS | TEMPERATURE: 98.4 F

## 2022-06-29 DIAGNOSIS — M17.0 PRIMARY OSTEOARTHRITIS OF BOTH KNEES: Primary | ICD-10-CM

## 2022-06-29 PROCEDURE — 20611 DRAIN/INJ JOINT/BURSA W/US: CPT | Performed by: ORTHOPAEDIC SURGERY

## 2022-06-29 NOTE — PROGRESS NOTES
Patient: Keaton Mujica                MRN: [id number]       SSN: [id number]  YOB: 1955        AGE: 69 y.o. SEX: male  Body mass index is 29.82 kg/m². PCP: Teetee Navas MD  06/29/22    Chief Complaint   Patient presents with    Injection     bilateral knee injection euflexxa #1        HISTORY:  Keaton Mujica is a 69 y.o. male who is seen for bilateral knee pain. Last Euflexxa was helpful for at least 6 months. PROCEDURE:  Patient's both knees, after timeout under sterile conditions, was injected with 2 cc of Euflexxa. [facility] - [facility]  OFFICE PROCEDURE PROGRESS NOTE    . Bilateral knees aspiration and Injection with Ultrasound Guidance    Indication:Bilateral Knee pain/swelling    [facility]  OFFICE PROCEDURE PROGRESS NOTE        Chart reviewed for the following:  Jessica Cano M.D, have reviewed the History, Physical and updated the Allergic reactions for [address] performed immediately prior to start of procedure:  Jessica Cano M.D, have performed the following reviews on Keaton Mujica prior to the start of the procedure:            * Patient was identified by name and date of birth   * Agreement on procedure being performed was verified  * Risks and Benefits explained to the patient  * Procedure site verified and marked as necessary  * Patient was positioned for comfort  * Needle placement confirmed by ultrasound  * Consent was signed and verified     Time: 1:17 PM     Date of procedure: 6/29/2022    Procedure performed by:  Genna Cantu M.D    Provider assisted by: (see medication administration)    How tolerated by patient: tolerated the procedure well with no complications    Comments: none        Chart reviewed for the following:   IGenna MD, have reviewed the History, Physical and updated the Allergic reactions for [address] Marci Delacruz     TIME OUT performed immediately prior to start of procedure:   Florencia Carcamo MD, have performed the following reviews on [facility] prior to the start of the procedure:            * Patient was identified by name and date of birth   * Agreement on procedure being performed was verified  * Risks and Benefits explained to the patient  * Procedure site verified and marked as necessary  * Patient was positioned for comfort  * Consent was signed and verified     Time: 1:16 PM       Date of procedure: 6/29/2022    Procedure performed by:  Mayelin Bose MD    Provider assisted by: None     How tolerated by patient: tolerated the procedure well with no complications    Comments: none    IMPRESSION:     ICD-10-CM ICD-9-CM    1. Primary osteoarthritis of both knees  M17.0 715.16 ARTHROCENTESIS ASPIR&/INJ MAJOR JT/BURSA W/US      sodium hyaluronate (SUPARTZ FX/EUFLEXXA/HYALGAN) 10 mg/mL injection syrg 20 mg        PLAN:  Mr. Tavon Patiño will return in one week for his second Euflexxa injection.       Scribed by Meka Cole ([address]) as dictated by MD Mayelin Roberts M.D.   [facility]

## 2022-07-07 ENCOUNTER — OFFICE VISIT (OUTPATIENT)
Dept: ORTHOPEDIC SURGERY | Age: 67
End: 2022-07-07
Payer: MEDICARE

## 2022-07-07 VITALS — HEIGHT: 73 IN | TEMPERATURE: 98.9 F | BODY MASS INDEX: 29.95 KG/M2 | WEIGHT: 226 LBS

## 2022-07-07 DIAGNOSIS — M17.0 PRIMARY OSTEOARTHRITIS OF BOTH KNEES: Primary | ICD-10-CM

## 2022-07-07 PROCEDURE — 20611 DRAIN/INJ JOINT/BURSA W/US: CPT | Performed by: ORTHOPAEDIC SURGERY

## 2022-07-07 NOTE — PROGRESS NOTES
Patient: Tre Calix                MRN: [id number]       SSN: [id number]  YOB: 1955        AGE: 69 y.o. SEX: male  There is no height or weight on file to calculate BMI. PCP: Saloni Almanzar MD  07/07/22    No chief complaint on file. HISTORY:  Tre Calix is a 69 y.o. male who is seen for reevaluation of bilateral knee pain and here for 2nd injection of Euflexxa. PROCEDURE:  Under ultrasound guidance, patient's Bilateral knees, after timeout under sterile conditions, was injected with 2 cc of Euflexxa. Intra-articular. Ultrasound images captured using [employer] Ultrasound machine using a frequency of 10 MHz with a linear transducer and scanned into patient's chart.      [facility]  OFFICE PROCEDURE PROGRESS NOTE     .Bilateral knees aspiration and Injection with Ultrasound Guidance     Indication:Bilateral Knee pain/swelling      [facility]  OFFICE PROCEDURE PROGRESS NOTE        Chart reviewed for the following:   Cristian Christy MD, have reviewed the History, Physical and updated the Allergic reactions for [address] [place] [address] performed immediately prior to start of procedure:   Cristian Christy MD, have performed the following reviews on Tre Calix prior to the start of the procedure:            * Patient was identified by name and date of birth   * Agreement on procedure being performed was verified  * Risks and Benefits explained to the patient  * Procedure site verified and marked as necessary  * Patient was positioned for comfort  * Needle placement confirmed by ultrasound  * Consent was signed and verified     Time: 7:43 AM    Date of procedure: 7/7/2022    Procedure performed by:  Jessica Carr MD    Provider assisted by: None     How tolerated by patient: tolerated the procedure well with no complications    Comments: none    IMPRESSION: ICD-10-CM ICD-9-CM    1. Primary osteoarthritis of both knees  M17.0 715.16         PLAN:  Mr. Sneha Beasley will return in one week for his third Euflexxa injection. Scribed by Castillo Lei (Nighat Vidal) as dictated by Sukhdeep Peters MD    I, Dr. Sukhdeep Peters, confirm that all documentation is accurate.     Sukhdeep Peters M.D.   [facility]

## 2022-07-13 ENCOUNTER — OFFICE VISIT (OUTPATIENT)
Dept: ORTHOPEDIC SURGERY | Age: 67
End: 2022-07-13
Payer: MEDICARE

## 2022-07-13 VITALS — TEMPERATURE: 97.5 F | BODY MASS INDEX: 29.95 KG/M2 | HEIGHT: 73 IN | WEIGHT: 226 LBS

## 2022-07-13 DIAGNOSIS — M17.0 PRIMARY OSTEOARTHRITIS OF BOTH KNEES: Primary | ICD-10-CM

## 2022-07-13 PROCEDURE — 20611 DRAIN/INJ JOINT/BURSA W/US: CPT | Performed by: ORTHOPAEDIC SURGERY

## 2022-07-13 NOTE — PROGRESS NOTES
Patient: Geovani Vanessa                MRN: [id number]       SSN: [id number]  YOB: 1955        AGE: 69 y.o. SEX: male  Body mass index is 29.82 kg/m². PCP: Kike Dow MD  07/13/22    Chief Complaint   Patient presents with    Knee Pain     bilateral       HISTORY:  Geovani Vanessa is a 69 y.o. male who is seen for reevaluation of Bilateral knee and here for 3rd and final injection of Euflexxa. PROCEDURE:  Under ultrasound guidance, patient's Bilateral knee, after timeout under sterile conditions, was injected with 2 cc of Euflexxa. Intra-articular. Ultrasound images captured using [facility] [employer] Ultrasound machine using a frequency of 10 MHz with a linear transducer and scanned into patient's chart. [facility] [place] [facility]  OFFICE PROCEDURE PROGRESS NOTE        Chart reviewed for the following:   Hina Hill MD, have reviewed the History, Physical and updated the Allergic reactions for [address] [place] [address] performed immediately prior to start of procedure:   Hina Hill MD, have performed the following reviews on Geovani Vanessa prior to the start of the procedure:            * Patient was identified by name and date of birth   * Agreement on procedure being performed was verified  * Risks and Benefits explained to the patient  * Procedure site verified and marked as necessary  * Patient was positioned for comfort  * Needle placement confirmed by ultrasound  * Consent was signed and verified     Time: 12:38 PM       Date of procedure: 7/13/2022    Procedure performed by:  Shadi Hanson MD    Provider assisted by: None     How tolerated by patient: tolerated the procedure well with no complications    Comments: none    IMPRESSION:     ICD-10-CM ICD-9-CM    1. Primary osteoarthritis of both knees  M17.0 715.16         PLAN: Mr. Sam Medrano has completed his Euflexxa injection series. he will return as needed.       Scribed by Susi Basia (Fabi Arteaga) as dictated by Rolo Gallagher MD    I, Dr. Rolo Gallagher, confirm that all documentation is accurate.     Rolo Gallagher M.D.   [facility]

## 2023-01-10 ENCOUNTER — OFFICE VISIT (OUTPATIENT)
Dept: ORTHOPEDIC SURGERY | Age: 68
End: 2023-01-10
Payer: MEDICARE

## 2023-01-10 VITALS — HEIGHT: 72 IN | HEART RATE: 67 BPM | OXYGEN SATURATION: 100 % | WEIGHT: 233 LBS | BODY MASS INDEX: 31.56 KG/M2

## 2023-01-10 DIAGNOSIS — M17.0 PRIMARY OSTEOARTHRITIS OF BOTH KNEES: Primary | ICD-10-CM

## 2023-01-10 NOTE — PROGRESS NOTES
Yovani Pod  1955   Chief Complaint   Patient presents with    Knee Pain     Bilateral knee pain        HISTORY OF PRESENT ILLNESS  Yovani Grover is a 69 y.o. male who presents today for reevaluation of b/l knee. Patient rates pain as 10/10 today. He finished a series of Euflexxa injections for both knees on 7/13/2022. The Euflexxa injections did provide relief but his b/l knee pain returned around November. Has been taking Tylenol as needed. Patient denies any fever, chills, chest pain, shortness of breath or calf pain. The remainder of the review of systems is negative. There are no new illness or injuries to report since last seen in the office. There are no changes to medications, allergies, family or social history. PHYSICAL EXAM:   Visit Vitals  Pulse 67   Ht 6' (1.829 m)   Wt 233 lb (105.7 kg)   SpO2 100%   BMI 31.60 kg/m²     The patient is a well-developed, well-nourished male   in no acute distress. The patient is alert and oriented times three. The patient is alert and oriented times three. Mood and affect are normal.  LYMPHATIC: lymph nodes are not enlarged and are within normal limits  SKIN: normal in color and non tender to palpation. There are no bruises or abrasions noted. NEUROLOGICAL: Motor sensory exam is within normal limits. Reflexes are equal bilaterally.  There is normal sensation to pinprick and light touch  MUSCULOSKELETAL:  Examination Left knee Right knee   Skin Intact Intact   Range of motion     Effusion + +   Medial joint line tenderness + +   Lateral joint line tenderness + +   Tenderness Pes Bursa - -   Tenderness insertion MCL - -   Tenderness insertion LCL - -   Renettas - -   Patella crepitus + +   Patella grind + +   Lachman - -   Pivot shift - -   Anterior drawer - -   Posterior drawer - -   Varus stress - -   Valgus stress - -   Neurovascular Intact Intact   Calf Swelling and Tenderness to Palpation - -   Shay's Test - -   Hamstring Cord Tightness - -       IMAGING: None    IMPRESSION:      ICD-10-CM ICD-9-CM    1. Primary osteoarthritis of both knees  M17.0 715.16            PLAN:   1. Pt presents today with b/l knee pain due to primary OA and we will proceed with Euflexxa authorization for both knees. Risk factors include: dm, BMI>30  2. No ultrasound exam indicated today  3. No cortisone injection indicated today   4. No Physical/Occupational Therapy indicated today  5. No diagnostic test indicated today:   6. No durable medical equipment indicated today  7. No referral indicated today   8. No medications indicated today:   9. No Narcotic indicated today     RTC following Euflexxa auth      Scribed by Tristian Adams) as dictated by Nisa Fernández MD    I, Dr. Nisa Fernández, confirm that all documentation is accurate.     Nisa Fernández M.D.   [facility]

## 2023-02-03 NOTE — TELEPHONE ENCOUNTER
----- Message from Meme Barreto MD sent at 2/3/2023  1:46 PM EST -----  Regarding: schedule egd  Please schedule him for EGD at [facility] surgical [place] Please place authorization for bilateral knees.  If pain is bad have him seen again before and we can repeat cortisone injections

## 2023-02-06 ENCOUNTER — DOCUMENTATION ONLY (OUTPATIENT)
Dept: ORTHOPEDIC SURGERY | Age: 68
End: 2023-02-06

## 2023-02-23 ENCOUNTER — OFFICE VISIT (OUTPATIENT)
Age: 68
End: 2023-02-23

## 2023-02-23 VITALS
HEART RATE: 87 BPM | WEIGHT: 233 LBS | BODY MASS INDEX: 31.56 KG/M2 | OXYGEN SATURATION: 98 % | HEIGHT: 72 IN | RESPIRATION RATE: 18 BRPM

## 2023-02-23 DIAGNOSIS — M17.0 BILATERAL PRIMARY OSTEOARTHRITIS OF KNEE: Primary | ICD-10-CM

## 2023-02-23 RX ORDER — HYALURONATE SODIUM 10 MG/ML
20 SYRINGE (ML) INTRAARTICULAR ONCE
Status: COMPLETED | OUTPATIENT
Start: 2023-02-23 | End: 2023-02-23

## 2023-02-23 RX ADMIN — Medication 20 MG: at 08:58

## 2023-02-23 RX ADMIN — Medication 20 MG: at 08:59

## 2023-02-23 NOTE — PROGRESS NOTES
Patient: Chito Duong                MRN: [id number]       SSN:   YOB: 1955        AGE: 69 y.o. SEX: male  Body mass index is 31.6 kg/m². PCP: Sandra Clayton MD  02/23/23    No chief complaint on file. HISTORY:  Chito Duong is a 69 y.o. male who is seen for reevaluation of Bilateral knee here for 1st injection of euflexxa    PROCEDURE: Bilateral  knee Injection with Ultrasound Guidance    Indication:Bilateral knee pain/swelling    After sterile prep, 2 cc of euflexxa were injected into the Bilateral Knee. Intra-articular Ultrasound images captured using [address] Ultrasound machine using a frequency of 10 MHz with a linear transducer and scanned into patient's chart. [facility]  OFFICE PROCEDURE PROGRESS NOTE        Chart reviewed for the following:   Pili Larios MD, have reviewed the History, Physical and updated the Allergic reactions for [address] performed immediately prior to start of procedure:   Pili Larios MD, have performed the following reviews on Chito Duong prior to the start of the procedure:            * Patient was identified by name and date of birth   * Agreement on procedure being performed was verified  * Risks and Benefits explained to the patient  * Procedure site verified and marked as necessary  * Patient was positioned for comfort  * Consent was signed and verified     Time: 8:49 AM       Date of procedure: 2/23/2023    Procedure performed by:  Edel Milligan MD    Provider assisted by: None     How tolerated by patient: tolerated the procedure well with no complications    Comments: none    IMPRESSION:     ICD-10-CM    1. Bilateral primary osteoarthritis of knee  M17.0            PLAN:  Mr. Manuel Hightower will return in one week for his second euflexxa injection.       Scribed by Arelis Starkey as dictated by Esther Brown MD    I, Dr. Rachael Fontana, confirm that all documentation is accurate.     Rachael Fontana M.D.   [facility]

## 2023-03-01 NOTE — PROGRESS NOTES
Patient: Luiz Calabrese                MRN: [id number]       SSN:   YOB: 1955        AGE: 77 y.o. SEX: male  There is no height or weight on file to calculate BMI. PCP: Kashmir Noland MD  03/02/23    No chief complaint on file. HISTORY:  uLiz Calabrese is a 77 y.o. male who is seen for reevaluation of Bilateral knee here for 2nd and 3rd injection of euflexxa    PROCEDURE: Bilateral  knee Injection with Ultrasound Guidance    Indication:Bilateral knee pain/swelling    After sterile prep, 2 cc of euflexxa were injected into the Bilateral Knee. Intra-articular Ultrasound images captured using [email or web address] [address] Ultrasound machine using a frequency of 10 MHz with a linear transducer and scanned into patient's chart. [facility]  OFFICE PROCEDURE PROGRESS NOTE      Chart reviewed for the following:   Anamika Graham MD, have reviewed the History, Physical and updated the Allergic reactions for [address] [address] performed immediately prior to start of procedure:   Anamika Graham MD, have performed the following reviews on Luiz Calabrese prior to the start of the procedure:            * Patient was identified by name and date of birth   * Agreement on procedure being performed was verified  * Risks and Benefits explained to the patient  * Procedure site verified and marked as necessary  * Patient was positioned for comfort  * Consent was signed and verified     Time: 9:11 AM       Date of procedure: 3/2/2023    Procedure performed by:  Villa Farah MD    Provider assisted by: None     How tolerated by patient: tolerated the procedure well with no complications    Comments: none    IMPRESSION:     ICD-10-CM    1. Bilateral primary osteoarthritis of knee  M17.0 sodium hyaluronate (EUFLEXXA, HYALGAN) injection 20 mg      2.  Primary osteoarthritis of left knee  M17.12 AMB POC US DRAIN/INJECT LARGE JOINT/BURSA 3. Primary osteoarthritis of right knee  M17.11 sodium hyaluronate (EUFLEXXA, HYALGAN) injection 20 mg           PLAN:  Mr. Celena Kraus will return in as his pain warrants. Scribed by Dean Ruiz ([address]) as dictated by MD SHERINE Salvador, Dr. Myra Nowak, confirm that all documentation is accurate.     Myra Nowak M.D.   [facility] and Spine Specialist

## 2023-03-02 ENCOUNTER — OFFICE VISIT (OUTPATIENT)
Age: 68
End: 2023-03-02

## 2023-03-02 VITALS — BODY MASS INDEX: 31.56 KG/M2 | TEMPERATURE: 97.8 F | HEIGHT: 72 IN | WEIGHT: 233 LBS

## 2023-03-02 DIAGNOSIS — M17.11 PRIMARY OSTEOARTHRITIS OF RIGHT KNEE: ICD-10-CM

## 2023-03-02 DIAGNOSIS — M17.0 BILATERAL PRIMARY OSTEOARTHRITIS OF KNEE: Primary | ICD-10-CM

## 2023-03-02 DIAGNOSIS — M17.12 PRIMARY OSTEOARTHRITIS OF LEFT KNEE: ICD-10-CM

## 2023-03-02 RX ORDER — HYALURONATE SODIUM 10 MG/ML
20 SYRINGE (ML) INTRAARTICULAR ONCE
Status: COMPLETED | OUTPATIENT
Start: 2023-03-02 | End: 2023-03-02

## 2023-03-02 RX ADMIN — Medication 20 MG: at 09:05

## 2023-03-02 RX ADMIN — Medication 20 MG: at 09:06

## 2023-03-09 ENCOUNTER — OFFICE VISIT (OUTPATIENT)
Age: 68
End: 2023-03-09

## 2023-03-09 VITALS — HEIGHT: 73 IN | BODY MASS INDEX: 30.88 KG/M2 | WEIGHT: 233 LBS

## 2023-03-09 DIAGNOSIS — M17.12 PRIMARY OSTEOARTHRITIS OF LEFT KNEE: ICD-10-CM

## 2023-03-09 DIAGNOSIS — M17.11 PRIMARY OSTEOARTHRITIS OF RIGHT KNEE: Primary | ICD-10-CM

## 2023-03-09 RX ORDER — HYALURONATE SODIUM 10 MG/ML
20 SYRINGE (ML) INTRAARTICULAR ONCE
Status: COMPLETED | OUTPATIENT
Start: 2023-03-09 | End: 2023-03-09

## 2023-03-09 RX ADMIN — Medication 20 MG: at 09:04

## 2023-03-09 RX ADMIN — Medication 20 MG: at 09:03

## 2023-03-09 NOTE — PROGRESS NOTES
Patient: Izzy Camarena                MRN: [id number]       SSN:   YOB: 1955        AGE: 77 y.o. SEX: male  Body mass index is 30.74 kg/m². PCP: Babita Power MD  03/09/23    Chief Complaint   Patient presents with    Injections     Bilat knee euflexxa #3       HISTORY:  Izzy Camarena is a 77 y.o. male who is seen for reevaluation of Bilateral knee here for 3rd injection of euflexxa    PROCEDURE: Bilateral  knee Injection with Ultrasound Guidance    Indication:Bilateral knee pain/swelling    After sterile prep, 2 cc of euflexxa were injected into the Bilateral Knee. Intra-articular Ultrasound images captured using [address] Ultrasound machine using a frequency of 10 MHz with a linear transducer and scanned into patient's chart. [facility]  OFFICE PROCEDURE PROGRESS NOTE        Chart reviewed for the following:   Earlene Schroeder MD, have reviewed the History, Physical and updated the Allergic reactions for [address] performed immediately prior to start of procedure:   Earlene Schroeder MD, have performed the following reviews on Izzy Camarena prior to the start of the procedure:            * Patient was identified by name and date of birth   * Agreement on procedure being performed was verified  * Risks and Benefits explained to the patient  * Procedure site verified and marked as necessary  * Patient was positioned for comfort  * Consent was signed and verified     Time: 9:05 AM       Date of procedure: 3/9/2023    Procedure performed by:  Shantell Morales MD    Provider assisted by: None     How tolerated by patient: tolerated the procedure well with no complications    Comments: none    IMPRESSION:     ICD-10-CM    1. Primary osteoarthritis of right knee  M17.11 sodium hyaluronate (EUFLEXXA, HYALGAN) injection 20 mg     AMB POC US DRAIN/INJECT LARGE JOINT/BURSA      2.  Primary osteoarthritis of left knee  M17.12 sodium hyaluronate (EUFLEXXA, HYALGAN) injection 20 mg     AMB POC US DRAIN/INJECT LARGE JOINT/BURSA           PLAN:  Mr. Essie Luna has completed their viscosupplementation series and will return as needed      Scribed by Ugo Patricio) as dictated by Romulo Jarquin MD    I, Dr. Romulo Jarquin, confirm that all documentation is accurate.     Romulo Jarquin M.D.   [facility]

## 2023-09-11 ENCOUNTER — TELEPHONE (OUTPATIENT)
Age: 68
End: 2023-09-11

## 2023-09-11 DIAGNOSIS — M17.12 PRIMARY OSTEOARTHRITIS OF LEFT KNEE: Primary | ICD-10-CM

## 2023-09-11 DIAGNOSIS — M17.11 PRIMARY OSTEOARTHRITIS OF RIGHT KNEE: ICD-10-CM

## 2023-09-11 NOTE — TELEPHONE ENCOUNTER
Order for [facility] Siemens placed, informed patient of 4 week turn over for approval.

## 2023-10-12 ENCOUNTER — OFFICE VISIT (OUTPATIENT)
Age: 68
End: 2023-10-12

## 2023-10-12 VITALS — BODY MASS INDEX: 30.07 KG/M2 | RESPIRATION RATE: 18 BRPM | HEIGHT: 72 IN | WEIGHT: 222 LBS

## 2023-10-12 DIAGNOSIS — M17.11 UNILATERAL PRIMARY OSTEOARTHRITIS, RIGHT KNEE: Primary | ICD-10-CM

## 2023-10-12 DIAGNOSIS — M17.12 UNILATERAL PRIMARY OSTEOARTHRITIS, LEFT KNEE: ICD-10-CM

## 2023-10-12 RX ORDER — HYALURONATE SODIUM 10 MG/ML
20 SYRINGE (ML) INTRAARTICULAR ONCE
Status: COMPLETED | OUTPATIENT
Start: 2023-10-12 | End: 2023-10-12

## 2023-10-12 RX ADMIN — Medication 20 MG: at 10:47

## 2023-10-19 ENCOUNTER — OFFICE VISIT (OUTPATIENT)
Age: 68
End: 2023-10-19

## 2023-10-19 VITALS — WEIGHT: 222 LBS | HEIGHT: 72 IN | BODY MASS INDEX: 30.07 KG/M2

## 2023-10-19 DIAGNOSIS — M17.11 PRIMARY OSTEOARTHRITIS OF RIGHT KNEE: ICD-10-CM

## 2023-10-19 DIAGNOSIS — M17.12 PRIMARY OSTEOARTHRITIS OF LEFT KNEE: Primary | ICD-10-CM

## 2023-10-19 RX ORDER — HYALURONATE SODIUM 10 MG/ML
20 SYRINGE (ML) INTRAARTICULAR ONCE
Status: COMPLETED | OUTPATIENT
Start: 2023-10-19 | End: 2023-10-19

## 2023-10-19 RX ADMIN — Medication 20 MG: at 10:33

## 2023-10-19 RX ADMIN — Medication 20 MG: at 10:34

## 2023-10-19 NOTE — PROGRESS NOTES
right knee  M17.11 AMB POC US DRAIN/INJECT LARGE JOINT/BURSA     sodium hyaluronate (EUFLEXXA, HYALGAN) injection 20 mg           PLAN:  Mr. Karla Ansari will return in one week for his third euflexxa injection. Scribed by Marbella Isaacs ([address] [place] [address]) as dictated by MD SHERINE Goode, Dr. Jessenia Keyes, confirm that all documentation is accurate.     Jessenia Keyes M.D.   [facility]

## 2023-10-25 ENCOUNTER — OFFICE VISIT (OUTPATIENT)
Age: 68
End: 2023-10-25

## 2023-10-25 VITALS — WEIGHT: 220 LBS | HEIGHT: 72 IN | BODY MASS INDEX: 29.8 KG/M2

## 2023-10-25 DIAGNOSIS — M17.12 PRIMARY OSTEOARTHRITIS OF LEFT KNEE: Primary | ICD-10-CM

## 2023-10-25 DIAGNOSIS — M17.11 PRIMARY OSTEOARTHRITIS OF RIGHT KNEE: ICD-10-CM

## 2023-10-25 RX ORDER — HYALURONATE SODIUM 10 MG/ML
20 SYRINGE (ML) INTRAARTICULAR ONCE
Status: COMPLETED | OUTPATIENT
Start: 2023-10-25 | End: 2023-10-25

## 2023-10-25 RX ADMIN — Medication 20 MG: at 14:54

## 2024-02-20 ENCOUNTER — OFFICE VISIT (OUTPATIENT)
Age: 69
End: 2024-02-20
Payer: MEDICARE

## 2024-02-20 DIAGNOSIS — S83.241A TEAR OF MEDIAL MENISCUS OF RIGHT KNEE, UNSPECIFIED TEAR TYPE, UNSPECIFIED WHETHER OLD OR CURRENT TEAR, INITIAL ENCOUNTER: ICD-10-CM

## 2024-02-20 DIAGNOSIS — M17.11 PRIMARY OSTEOARTHRITIS OF RIGHT KNEE: ICD-10-CM

## 2024-02-20 DIAGNOSIS — M17.12 PRIMARY OSTEOARTHRITIS OF LEFT KNEE: Primary | ICD-10-CM

## 2024-02-20 PROCEDURE — G8484 FLU IMMUNIZE NO ADMIN: HCPCS | Performed by: ORTHOPAEDIC SURGERY

## 2024-02-20 PROCEDURE — 99214 OFFICE O/P EST MOD 30 MIN: CPT | Performed by: ORTHOPAEDIC SURGERY

## 2024-02-20 PROCEDURE — G8417 CALC BMI ABV UP PARAM F/U: HCPCS | Performed by: ORTHOPAEDIC SURGERY

## 2024-02-20 PROCEDURE — 1123F ACP DISCUSS/DSCN MKR DOCD: CPT | Performed by: ORTHOPAEDIC SURGERY

## 2024-02-20 PROCEDURE — 73560 X-RAY EXAM OF KNEE 1 OR 2: CPT | Performed by: ORTHOPAEDIC SURGERY

## 2024-02-20 PROCEDURE — G8427 DOCREV CUR MEDS BY ELIG CLIN: HCPCS | Performed by: ORTHOPAEDIC SURGERY

## 2024-02-20 PROCEDURE — 1036F TOBACCO NON-USER: CPT | Performed by: ORTHOPAEDIC SURGERY

## 2024-02-20 PROCEDURE — 3017F COLORECTAL CA SCREEN DOC REV: CPT | Performed by: ORTHOPAEDIC SURGERY

## 2024-02-20 NOTE — PROGRESS NOTES
Jaosn Brooks  1955   Chief Complaint   Patient presents with    Knee Pain     bilateral        HISTORY OF PRESENT ILLNESS  Jason Brooks is a 68 y.o. male who presents today for reevaluation of bilateral knee pain (R>L). Pain is a 8/10. Pt completed his 3-series euflexxa injections for his bilateral knee on 10/25/2023 which provided him with relief until recently. The patient states that he wishes to pursue further, potentially longer lasting, treatment options at this point    Patient denies any fever, chills, chest pain, shortness of breath or calf pain. The remainder of the review of systems is negative. There are no new illness or injuries to report since last seen in the office. No changes in medications, allergies, social or family history.      PHYSICAL EXAM:   There were no vitals taken for this visit.  The patient is a well-developed, well-nourished male   in no acute distress.  The patient is alert and oriented times three.  The patient is alert and oriented times three. Mood and affect are normal.  LYMPHATIC: lymph nodes are not enlarged and are within normal limits  SKIN: normal in color and non tender to palpation. There are no bruises or abrasions noted.   NEUROLOGICAL: Motor sensory exam is within normal limits. Reflexes are equal bilaterally. There is normal sensation to pinprick and light touch  MUSCULOSKELETAL:       Examination  Left knee  Right knee     Skin  Intact  Intact     Range of motion             Effusion  +  + Marked degenerative change         Medial joint line tenderness  +  +     Lateral joint line tenderness  +  +         Tenderness Pes Bursa  -  -     Tenderness insertion MCL  -  -     Tenderness insertion LCL  -  -     Renee's  -  -         Patella crepitus  +  +     Patella grind  +  +     Lachman  -  -     Pivot shift  -  -     Anterior drawer  -  -     Posterior drawer  -  -     Varus stress  -  -     Valgus stress  -  -         Neurovascular  Intact

## 2024-02-21 ENCOUNTER — TELEPHONE (OUTPATIENT)
Age: 69
End: 2024-02-21

## 2024-02-21 NOTE — TELEPHONE ENCOUNTER
MRI order faxed to both numbers. Called and made patient aware. Also advised patient to bring a copy of the report to his visit for review.

## 2024-02-21 NOTE — TELEPHONE ENCOUNTER
Patient would like the MRI order to be sent to [facility] in [place].    Fax: [phone]  2nd Fax: [phone]    Please fax to both numbers, the facility requested as they are transitioning and are making sure it goes through.    Please advise patient when sent, [phone]

## 2024-03-01 DIAGNOSIS — S83.241A TEAR OF MEDIAL MENISCUS OF RIGHT KNEE, UNSPECIFIED TEAR TYPE, UNSPECIFIED WHETHER OLD OR CURRENT TEAR, INITIAL ENCOUNTER: ICD-10-CM

## 2024-03-06 ENCOUNTER — TELEPHONE (OUTPATIENT)
Age: 69
End: 2024-03-06

## 2024-03-06 DIAGNOSIS — M17.11 PRIMARY OSTEOARTHRITIS OF RIGHT KNEE: ICD-10-CM

## 2024-03-06 DIAGNOSIS — M17.12 PRIMARY OSTEOARTHRITIS OF LEFT KNEE: Primary | ICD-10-CM

## 2024-03-06 NOTE — TELEPHONE ENCOUNTER
Patient is requesting a new order for bilat injection series, last series ended 10/25/23.  Last authorization noted patient is eligible every 6 months.  Please prepare order for authorization and scheduling for April.,

## 2024-04-11 ENCOUNTER — OFFICE VISIT (OUTPATIENT)
Age: 69
End: 2024-04-11
Payer: MEDICARE

## 2024-04-11 VITALS — BODY MASS INDEX: 29.84 KG/M2 | HEIGHT: 72 IN

## 2024-04-11 DIAGNOSIS — M17.12 PRIMARY OSTEOARTHRITIS OF LEFT KNEE: ICD-10-CM

## 2024-04-11 DIAGNOSIS — M17.11 PRIMARY OSTEOARTHRITIS OF RIGHT KNEE: Primary | ICD-10-CM

## 2024-04-11 PROCEDURE — 20611 DRAIN/INJ JOINT/BURSA W/US: CPT | Performed by: ORTHOPAEDIC SURGERY

## 2024-04-11 RX ORDER — HYALURONATE SODIUM 10 MG/ML
20 SYRINGE (ML) INTRAARTICULAR ONCE
Status: COMPLETED | OUTPATIENT
Start: 2024-04-11 | End: 2024-04-11

## 2024-04-11 RX ADMIN — Medication 20 MG: at 08:40

## 2024-04-11 RX ADMIN — Medication 20 MG: at 08:39

## 2024-04-11 NOTE — PROGRESS NOTES
Patient: Jason Brokos                MRN: [id number]       SSN: [id number]  YOB: 1955        AGE: 69 y.o.        SEX: male  There is no height or weight on file to calculate BMI.    PCP: Sean Miranda MD  04/11/24    No chief complaint on file.      HISTORY:  Jason Brooks is a 69 y.o. male who is seen for reevaluation of Bilateral knee here for 1st injection of euflexxa    IMAGING: MRI of the right knee obtained from [facility] dated 02/25/2024 reviewed and read by Dr. Hart: 1. Intact ligaments.    2. Evidence of previous partial medial menisectomy. There is linear signal hyperintensity seen within the posterior horn of the medial meniscus and contacts the inferior articular surface. Given the prior surgery, the findings are strictly indeterminate whether related postoperative change or meniscal retear.     3. Findings suggest a subtle apical tear of the posterior horn of the lateral meniscus in the absence of prior surgery to account for this.    4. Tricompartmental degenerative changes greatest and moderately severe degree within the medial tibiofemoral joint compartment.     5. Mild grade IV patellar chondromalacia.    6. Moderate osteochondral changes femoral trochlea noted along the lateral facet.     7. There is mild edema within Hoffa's fat superolaterally nonspecific whether to impingement phenomenon.    8. A mild joint effusion noted addition to a very small popliteal cyst.     PROCEDURE: Bilateral  knee Injection with Ultrasound Guidance    Indication:Bilateral knee pain/swelling    After sterile prep, 2 cc of euflexxa were injected into the Bilateral Knee. Intra-articular Ultrasound images captured using [employer] Ultrasound machine using a frequency of 10 MHz with a linear transducer and scanned into patient's chart.        [facility] - [facility]  OFFICE PROCEDURE PROGRESS NOTE        Chart reviewed for the

## 2024-04-18 ENCOUNTER — OFFICE VISIT (OUTPATIENT)
Age: 69
End: 2024-04-18
Payer: MEDICARE

## 2024-04-18 VITALS — HEIGHT: 72 IN | WEIGHT: 220 LBS | BODY MASS INDEX: 29.8 KG/M2

## 2024-04-18 DIAGNOSIS — M17.11 PRIMARY OSTEOARTHRITIS OF RIGHT KNEE: Primary | ICD-10-CM

## 2024-04-18 DIAGNOSIS — M17.12 PRIMARY OSTEOARTHRITIS OF LEFT KNEE: ICD-10-CM

## 2024-04-18 PROCEDURE — 20611 DRAIN/INJ JOINT/BURSA W/US: CPT | Performed by: ORTHOPAEDIC SURGERY

## 2024-04-18 RX ORDER — HYALURONATE SODIUM 10 MG/ML
20 SYRINGE (ML) INTRAARTICULAR ONCE
Status: COMPLETED | OUTPATIENT
Start: 2024-04-18 | End: 2024-04-18

## 2024-04-18 RX ADMIN — Medication 20 MG: at 11:17

## 2024-04-18 NOTE — PROGRESS NOTES
Patient: Jason Brooks                MRN: [id number]       SSN: [id number]  YOB: 1955        AGE: 69 y.o.        SEX: male  There is no height or weight on file to calculate BMI.    PCP: Sean Miranda MD  04/18/24    No chief complaint on file.      HISTORY:  Jason Brooks is a 69 y.o. male who is seen for reevaluation of Bilateral knee here for 2nd injection of euflexxa    PROCEDURE: Bilateral  knee Injection with Ultrasound Guidance    Indication:Bilateral knee pain/swelling    After sterile prep, 2 cc of euflexxa were injected into the Bilateral Knee. Intra-articular Ultrasound images captured using [employer] Ultrasound machine using a frequency of 10 MHz with a linear transducer and scanned into patient's chart.        [facility]  OFFICE PROCEDURE PROGRESS NOTE        Chart reviewed for the following:   Rahul BASURTO MD, have reviewed the History, Physical and updated the Allergic reactions for Jason Brooks     TIME OUT performed immediately prior to start of procedure:   Rahul BASURTO MD, have performed the following reviews on Jason Brooks prior to the start of the procedure:            * Patient was identified by name and date of birth   * Agreement on procedure being performed was verified  * Risks and Benefits explained to the patient  * Procedure site verified and marked as necessary  * Patient was positioned for comfort  * Consent was signed and verified     Time: 8:41 AM       Date of procedure: 4/18/2024    Procedure performed by:  Rahul Hart MD    Provider assisted by: None     How tolerated by patient: tolerated the procedure well with no complications    Comments: none    IMPRESSION:     ICD-10-CM    1. Primary osteoarthritis of right knee  M17.11       2. Primary osteoarthritis of left knee  M17.12            PLAN:  Mr. Brooks will return in one week for his third euflexxa injection.      Scribed by

## 2024-04-24 NOTE — PROGRESS NOTES
Patient: Jason Brooks                MRN: [id number]       SSN: [id number]  YOB: 1955        AGE: 69 y.o.        SEX: male  There is no height or weight on file to calculate BMI.    PCP: Sean Miranda MD  04/24/24    No chief complaint on file.      HISTORY:  Jason Brooks is a 69 y.o. male who is seen for reevaluation of Bilateral knee here for 3rd injection of euflexxa    PROCEDURE: Bilateral  knee Injection with Ultrasound Guidance    Indication:Bilateral knee pain/swelling    After sterile prep, 2 cc of euflexxa were injected into the Bilateral Knee. Intra-articular Ultrasound images captured using [employer] Ultrasound machine using a frequency of 10 MHz with a linear transducer and scanned into patient's chart.        [facility] - [facility]  OFFICE PROCEDURE PROGRESS NOTE        Chart reviewed for the following:   Rahul BASURTO MD, have reviewed the History, Physical and updated the Allergic reactions for Jason Brooks     TIME OUT performed immediately prior to start of procedure:   Rahul BASURTO MD, have performed the following reviews on Jason Brooks prior to the start of the procedure:            * Patient was identified by name and date of birth   * Agreement on procedure being performed was verified  * Risks and Benefits explained to the patient  * Procedure site verified and marked as necessary  * Patient was positioned for comfort  * Consent was signed and verified     Time: 2:05 PM       Date of procedure: 4/24/2024    Procedure performed by:  Rahul Hart MD    Provider assisted by: None     How tolerated by patient: tolerated the procedure well with no complications    Comments: none    IMPRESSION:     ICD-10-CM    1. Primary osteoarthritis of right knee  M17.11       2. Primary osteoarthritis of left knee  M17.12            PLAN:  Mr. Brooks has completed their viscosupplementation series and will return as

## 2024-04-25 ENCOUNTER — OFFICE VISIT (OUTPATIENT)
Age: 69
End: 2024-04-25
Payer: MEDICARE

## 2024-04-25 VITALS — HEIGHT: 72 IN | BODY MASS INDEX: 29.84 KG/M2

## 2024-04-25 DIAGNOSIS — M17.11 PRIMARY OSTEOARTHRITIS OF RIGHT KNEE: Primary | ICD-10-CM

## 2024-04-25 DIAGNOSIS — M17.12 PRIMARY OSTEOARTHRITIS OF LEFT KNEE: ICD-10-CM

## 2024-04-25 PROCEDURE — 20611 DRAIN/INJ JOINT/BURSA W/US: CPT | Performed by: ORTHOPAEDIC SURGERY

## 2024-04-25 RX ORDER — HYALURONATE SODIUM 10 MG/ML
20 SYRINGE (ML) INTRAARTICULAR ONCE
Status: COMPLETED | OUTPATIENT
Start: 2024-04-25 | End: 2024-04-25

## 2024-04-25 RX ADMIN — Medication 20 MG: at 08:45

## 2024-04-25 RX ADMIN — Medication 20 MG: at 08:44

## 2024-09-05 ENCOUNTER — TELEPHONE (OUTPATIENT)
Age: 69
End: 2024-09-05

## 2024-09-05 NOTE — TELEPHONE ENCOUNTER
Patient called in and stated that he wanted to start the process of getting injections..     Please advise patient @ [phone]

## 2024-09-05 NOTE — TELEPHONE ENCOUNTER
Please advise patient just completed euflexxa in ending of apirl can't get more til ending of October. Can only have it every 6 months

## 2024-10-25 ENCOUNTER — TELEPHONE (OUTPATIENT)
Age: 69
End: 2024-10-25

## 2024-10-25 DIAGNOSIS — M17.11 PRIMARY OSTEOARTHRITIS OF RIGHT KNEE: Primary | ICD-10-CM

## 2024-10-25 DIAGNOSIS — M17.12 PRIMARY OSTEOARTHRITIS OF LEFT KNEE: ICD-10-CM

## 2024-10-25 NOTE — TELEPHONE ENCOUNTER
Patient called to ask if he can get a new order for gel injections for bilateral knees, he finished his last series 4/25/2024.    Please review and advise patient, [phone]

## 2024-11-14 ENCOUNTER — OFFICE VISIT (OUTPATIENT)
Age: 69
End: 2024-11-14
Payer: MEDICARE

## 2024-11-14 VITALS — HEIGHT: 72 IN | BODY MASS INDEX: 29.8 KG/M2 | WEIGHT: 220 LBS

## 2024-11-14 DIAGNOSIS — M17.11 PRIMARY OSTEOARTHRITIS OF RIGHT KNEE: Primary | ICD-10-CM

## 2024-11-14 DIAGNOSIS — M17.12 PRIMARY OSTEOARTHRITIS OF LEFT KNEE: ICD-10-CM

## 2024-11-14 PROCEDURE — 20611 DRAIN/INJ JOINT/BURSA W/US: CPT | Performed by: NURSE PRACTITIONER

## 2024-11-14 RX ORDER — HYALURONATE SODIUM 10 MG/ML
20 SYRINGE (ML) INTRAARTICULAR ONCE
Status: COMPLETED | OUTPATIENT
Start: 2024-11-14 | End: 2024-11-14

## 2024-11-14 RX ADMIN — Medication 20 MG: at 14:34

## 2024-11-14 NOTE — PROGRESS NOTES
Patient: Jason Brooks                MRN: [id number]       SSN: [id number]  YOB: 1955        AGE: 69 y.o.        SEX: male  There is no height or weight on file to calculate BMI.    PCP: Sean Miranda MD  11/14/24    No chief complaint on file.      HISTORY:  Jason Brooks is a 69 y.o. male who is seen for reevaluation of Bilateral knee here for 1st injection of euflexxa    PROCEDURE: Bilateral  knee Injection with Ultrasound Guidance    Indication:Bilateral knee pain/swelling    After sterile prep, 2 cc of euflexxa were injected into the Bilateral Knee. Intra-articular Ultrasound images captured using [employer] Ultrasound machine using a frequency of 10 MHz with a linear transducer and scanned into patient's chart.        [facility] - [facility]  OFFICE PROCEDURE PROGRESS NOTE        Chart reviewed for the following:   Fabiola BASURTO NP have reviewed the History, Physical and updated the Allergic reactions for Jason Brooks     TIME OUT performed immediately prior to start of procedure:   Fabiola BASURTO NP, have performed the following reviews on Jason Brooks prior to the start of the procedure:            * Patient was identified by name and date of birth   * Agreement on procedure being performed was verified  * Risks and Benefits explained to the patient  * Procedure site verified and marked as necessary  * Patient was positioned for comfort  * Consent was signed and verified     Time: 7:41 AM       Date of procedure: 11/14/2024    Procedure performed by:  Fabiola Wilde NP    Provider assisted by: None     How tolerated by patient: tolerated the procedure well with no complications    Comments: none    IMPRESSION: No diagnosis found.     PLAN:  Mr. Brooks will return in one week for his second euflexxa injection.

## 2024-11-20 NOTE — PROGRESS NOTES
Patient: Jason Brooks                MRN: [id number]       SSN: [id number]  YOB: 1955        AGE: 69 y.o.        SEX: male  There is no height or weight on file to calculate BMI.    PCP: Sean Miranda MD  11/20/24    No chief complaint on file.      HISTORY:  Jason Brooks is a 69 y.o. male who is seen for reevaluation of Bilateral knee here for 2nd injection of euflexxa    PROCEDURE: Bilateral  knee Injection with Ultrasound Guidance    Indication:Bilateral knee pain/swelling    After sterile prep, 2 cc of euflexxa were injected into the Bilateral Knee. Intra-articular Ultrasound images captured using [employer] Ultrasound machine using a frequency of 10 MHz with a linear transducer and scanned into patient's chart.        [facility] - [facility]  OFFICE PROCEDURE PROGRESS NOTE        Chart reviewed for the following:   Rahul BASURTO MD, have reviewed the History, Physical and updated the Allergic reactions for Jason Brooks     TIME OUT performed immediately prior to start of procedure:   Rahul BASURTO MD, have performed the following reviews on Jason Brooks prior to the start of the procedure:            * Patient was identified by name and date of birth   * Agreement on procedure being performed was verified  * Risks and Benefits explained to the patient  * Procedure site verified and marked as necessary  * Patient was positioned for comfort  * Consent was signed and verified     Time: 1:43 PM       Date of procedure: 11/20/2024    Procedure performed by:  Rahul Hart MD    Provider assisted by: None     How tolerated by patient: tolerated the procedure well with no complications    Comments: none    IMPRESSION: No diagnosis found.     PLAN:  Mr. Brooks will return in one week for his third euflexxa injection.      By signing my name below, NEREYDA Faria, attarpit that this documentation has been prepared under the

## 2024-11-21 ENCOUNTER — OFFICE VISIT (OUTPATIENT)
Age: 69
End: 2024-11-21

## 2024-11-21 DIAGNOSIS — M17.12 PRIMARY OSTEOARTHRITIS OF LEFT KNEE: ICD-10-CM

## 2024-11-21 DIAGNOSIS — M17.11 PRIMARY OSTEOARTHRITIS OF RIGHT KNEE: Primary | ICD-10-CM

## 2024-11-21 RX ORDER — HYALURONATE SODIUM 10 MG/ML
20 SYRINGE (ML) INTRAARTICULAR ONCE
Status: COMPLETED | OUTPATIENT
Start: 2024-11-21 | End: 2024-11-21

## 2024-11-21 RX ADMIN — Medication 20 MG: at 08:45

## 2024-11-26 NOTE — PROGRESS NOTES
Patient: Jason Brooks                MRN: [id number]       SSN: [id number]  YOB: 1955        AGE: 69 y.o.        SEX: male  There is no height or weight on file to calculate BMI.    PCP: Sean Miranda MD  11/26/24    Knee pain    HISTORY:  Jason Brooks is a 69 y.o. male who is seen for reevaluation of Bilateral knee here for 3rd injection of euflexxa    PROCEDURE: Bilateral  knee Injection with Ultrasound Guidance    Indication:Bilateral knee pain/swelling    After sterile prep, 2 cc of euflexxa were injected into the Bilateral Knee. Intra-articular Ultrasound images captured using [employer] Ultrasound machine using a frequency of 10 MHz with a linear transducer and scanned into patient's chart.        [facility] - [facility]  OFFICE PROCEDURE PROGRESS NOTE        Chart reviewed for the following:   Fabiola BASURTO AGACNP-BC, have reviewed the History, Physical and updated the Allergic reactions for Jason Brooks     TIME OUT performed immediately prior to start of procedure:   Fabiola BASURTO AGACNP-BC, have performed the following reviews on Jason Brooks prior to the start of the procedure:            * Patient was identified by name and date of birth   * Agreement on procedure being performed was verified  * Risks and Benefits explained to the patient  * Procedure site verified and marked as necessary  * Patient was positioned for comfort  * Consent was signed and verified     Time: 12:44 PM       Date of procedure: 11/26/2024    Procedure performed by:  SPENSER Ponce    Provider assisted by: None     How tolerated by patient: tolerated the procedure well with no complications    Comments: none    IMPRESSION: No diagnosis found.     PLAN:  Mr. Brooks has completed their viscosupplementation series and will return as needed

## 2024-12-02 ENCOUNTER — OFFICE VISIT (OUTPATIENT)
Age: 69
End: 2024-12-02
Payer: MEDICARE

## 2024-12-02 DIAGNOSIS — M17.12 PRIMARY OSTEOARTHRITIS OF LEFT KNEE: ICD-10-CM

## 2024-12-02 DIAGNOSIS — M17.11 PRIMARY OSTEOARTHRITIS OF RIGHT KNEE: Primary | ICD-10-CM

## 2024-12-02 PROCEDURE — 20611 DRAIN/INJ JOINT/BURSA W/US: CPT | Performed by: NURSE PRACTITIONER

## 2024-12-02 RX ORDER — HYALURONATE SODIUM 10 MG/ML
20 SYRINGE (ML) INTRAARTICULAR ONCE
Status: COMPLETED | OUTPATIENT
Start: 2024-12-02 | End: 2024-12-02

## 2024-12-02 RX ADMIN — Medication 20 MG: at 10:54

## 2025-06-03 ENCOUNTER — TELEPHONE (OUTPATIENT)
Age: 70
End: 2025-06-03

## 2025-06-03 DIAGNOSIS — M17.11 PRIMARY OSTEOARTHRITIS OF RIGHT KNEE: Primary | ICD-10-CM

## 2025-06-03 DIAGNOSIS — M17.12 PRIMARY OSTEOARTHRITIS OF LEFT KNEE: ICD-10-CM

## 2025-06-03 NOTE — TELEPHONE ENCOUNTER
Pt called requesting that an order be placed for his bilateral knee gel injections.    callback# [phone]

## 2025-07-08 NOTE — PROGRESS NOTES
Patient: Jason Brooks                MRN: [id number]       SSN: [id number]  YOB: 1955        AGE: 70 y.o.        SEX: male  Body mass index is 31.55 kg/m².    PCP: Sean Miranda MD  07/10/25    Chief Complaint   Patient presents with    Follow-up     Bilateral Euflexxa injections #1       HISTORY:  Jason Brooks is a 70 y.o. male who is seen for reevaluation of Bilateral knee here for 1st injection of euflexxa. Believes his pain is from when he was in the .    PROCEDURE: Bilateral  knee Injection with Ultrasound Guidance    Indication:Bilateral knee pain/swelling    After sterile prep, 2 cc of euflexxa were injected into the Bilateral Knee. Intra-articular Ultrasound images captured using [employer] Ultrasound machine using a frequency of 10 MHz with a linear transducer and scanned into patient's chart.        [facility]  OFFICE PROCEDURE PROGRESS NOTE        Chart reviewed for the following:   Rahul BASURTO MD, have reviewed the History, Physical and updated the Allergic reactions for Jason Brooks     TIME OUT performed immediately prior to start of procedure:   Rahul BASURTO MD, have performed the following reviews on Jason Brooks prior to the start of the procedure:            * Patient was identified by name and date of birth   * Agreement on procedure being performed was verified  * Risks and Benefits explained to the patient  * Procedure site verified and marked as necessary  * Patient was positioned for comfort  * Consent was signed and verified     Time: 10:14 AM       Date of procedure: 7/10/2025    Procedure performed by:  Rahul Hart MD    Provider assisted by: None     How tolerated by patient: tolerated the procedure well with no complications    Comments: none    IMPRESSION:     ICD-10-CM    1. Primary osteoarthritis of right knee  M17.11 AMB POC US DRAIN/INJECT LARGE JOINT/BURSA     sodium

## 2025-07-10 ENCOUNTER — OFFICE VISIT (OUTPATIENT)
Age: 70
End: 2025-07-10
Payer: MEDICARE

## 2025-07-10 VITALS — WEIGHT: 232.6 LBS | HEIGHT: 72 IN | BODY MASS INDEX: 31.51 KG/M2

## 2025-07-10 DIAGNOSIS — M17.11 PRIMARY OSTEOARTHRITIS OF RIGHT KNEE: Primary | ICD-10-CM

## 2025-07-10 DIAGNOSIS — M17.12 PRIMARY OSTEOARTHRITIS OF LEFT KNEE: ICD-10-CM

## 2025-07-10 PROCEDURE — 20611 DRAIN/INJ JOINT/BURSA W/US: CPT | Performed by: ORTHOPAEDIC SURGERY

## 2025-07-17 ENCOUNTER — OFFICE VISIT (OUTPATIENT)
Age: 70
End: 2025-07-17

## 2025-07-17 VITALS — HEIGHT: 72 IN | BODY MASS INDEX: 31.42 KG/M2 | WEIGHT: 232 LBS

## 2025-07-17 DIAGNOSIS — M17.12 PRIMARY OSTEOARTHRITIS OF LEFT KNEE: ICD-10-CM

## 2025-07-17 DIAGNOSIS — M17.11 PRIMARY OSTEOARTHRITIS OF RIGHT KNEE: Primary | ICD-10-CM

## 2025-07-23 NOTE — PROGRESS NOTES
Patient: Jason Brooks                MRN: [id number]       SSN: [id number]  YOB: 1955        AGE: 70 y.o.        SEX: male  There is no height or weight on file to calculate BMI.    PCP: Sean Miranda MD  07/23/25    No chief complaint on file.      HISTORY:  Jason Brooks is a 70 y.o. male who is seen for reevaluation of Bilateral knee here for 3rd injection of euflexxa    PROCEDURE: Bilateral  knee Injection with Ultrasound Guidance    Indication:Bilateral knee pain/swelling    After sterile prep, 2 cc of euflexxa were injected into the Bilateral Knee. Intra-articular Ultrasound images captured using [employer] Ultrasound machine using a frequency of 10 MHz with a linear transducer and scanned into patient's chart.        [facility] - [facility]  OFFICE PROCEDURE PROGRESS NOTE        Chart reviewed for the following:   Rahul BASURTO MD, have reviewed the History, Physical and updated the Allergic reactions for Jason Brooks     TIME OUT performed immediately prior to start of procedure:   Rahul BASURTO MD, have performed the following reviews on Jason Brooks prior to the start of the procedure:            * Patient was identified by name and date of birth   * Agreement on procedure being performed was verified  * Risks and Benefits explained to the patient  * Procedure site verified and marked as necessary  * Patient was positioned for comfort  * Consent was signed and verified     Time: 1:38 PM       Date of procedure: 7/23/2025    Procedure performed by:  Rahul Hart MD    Provider assisted by: None     How tolerated by patient: tolerated the procedure well with no complications    Comments: none    IMPRESSION: No diagnosis found.     PLAN:  Mr. Brooks has completed their viscosupplementation series and will return as needed    By signing my name below, NEREYDA Faria, attarpit that this documentation has been prepared

## 2025-07-24 ENCOUNTER — OFFICE VISIT (OUTPATIENT)
Age: 70
End: 2025-07-24

## 2025-07-24 DIAGNOSIS — M17.12 PRIMARY OSTEOARTHRITIS OF LEFT KNEE: Primary | ICD-10-CM

## 2025-07-24 DIAGNOSIS — M17.11 PRIMARY OSTEOARTHRITIS OF RIGHT KNEE: ICD-10-CM

## (undated) DEVICE — ELECTRODE ES AD DISPER HYDRGEL THN FOAM ADH SCALLOPED EDGE

## (undated) DEVICE — DRAPE,REIN 53X77,STERILE: Brand: MEDLINE

## (undated) DEVICE — TRAY SUPP STD NO DRUG W EXTENSION SET

## (undated) DEVICE — MIRAGE SWIFT II PILLOW LGE: Brand: MIRAGE SWIFT II

## (undated) DEVICE — CUFF BLD PRESSURE MONITORING LNG AD 23-33 CM 1 TUBE MY CUF

## (undated) DEVICE — (D)BNDG ADHESIVE FABRIC 3/4X3 -- DISC BY MFR USE ITEM 357960

## (undated) DEVICE — DRAPE TWL SURG 16X26IN BLU ORB04] ALLCARE INC]

## (undated) DEVICE — CURVED SHARP RF CANNULA, RADIOPAQUE MARKER: Brand: RADIOPAQUE RADIOFREQUENCY CANNULA